# Patient Record
Sex: FEMALE | Race: WHITE | NOT HISPANIC OR LATINO | Employment: FULL TIME | URBAN - METROPOLITAN AREA
[De-identification: names, ages, dates, MRNs, and addresses within clinical notes are randomized per-mention and may not be internally consistent; named-entity substitution may affect disease eponyms.]

---

## 2018-04-04 ENCOUNTER — OFFICE VISIT (OUTPATIENT)
Dept: LAB | Facility: SCHOOL | Age: 38
End: 2018-04-04
Payer: COMMERCIAL

## 2018-04-04 VITALS
DIASTOLIC BLOOD PRESSURE: 78 MMHG | WEIGHT: 162 LBS | HEART RATE: 86 BPM | TEMPERATURE: 98.7 F | HEIGHT: 65 IN | OXYGEN SATURATION: 98 % | BODY MASS INDEX: 26.99 KG/M2 | SYSTOLIC BLOOD PRESSURE: 136 MMHG

## 2018-04-04 DIAGNOSIS — Z00.00 ENCOUNTER FOR WELLNESS EXAMINATION: Primary | ICD-10-CM

## 2018-04-04 PROCEDURE — 99203 OFFICE O/P NEW LOW 30 MIN: CPT | Performed by: NURSE PRACTITIONER

## 2018-04-04 RX ORDER — PRENATAL VIT/IRON FUM/FOLIC AC 27MG-0.8MG
1 TABLET ORAL
COMMUNITY
Start: 2013-07-02 | End: 2018-10-17

## 2018-04-04 RX ORDER — METRONIDAZOLE 500 MG/1
500 TABLET ORAL
COMMUNITY
Start: 2018-03-30 | End: 2018-04-06

## 2018-04-04 NOTE — PROGRESS NOTES
"  SUBJECTIVE:  CC: Josie Gamez is an 37 year old woman who presents for Wellness Check at [facility] [id number].     Review of Healthy Lifestyle:    Do you get at least three servings of calcium containing foods daily (dairy, green leafy vegetables, etc.)? yes     Do you have a high-fiber diet? no     Amount of exercise or daily activities, outside of work: 2 day(s) per week    Do you wear sunscreen on a regular basis? Yes      Are you taking your medications regularly Yes    Have you had an eye exam in the past two years? yes    Do you see a dentist twice per year? yes    Do you have sleep apnea, excessive snoring or excessive daytime drowsiness? no    Do you use tobacco in any form? no       OBJECTIVE:    Vitals: /78  Pulse 86  Temp 98.7  F (37.1  C) (Tympanic)  Ht 5' 5\" (1.651 m)  Wt 162 lb (73.5 kg)  SpO2 98%  BMI 26.96 kg/m2  BMI= Body mass index is 26.96 kg/(m^2).    HEARING: Right Ear:        500Hz:  RESPONSE- on Level: 25 db   1000 Hz: RESPONSE- on Level: 25 db   2000 Hz: RESPONSE- on Level:   20 db    4000 Hz: RESPONSE- on Level:   20 db     Left Ear:       500Hz:  RESPONSE- on Level: 25 db   1000 Hz: RESPONSE- on Level: 25 db   2000 Hz: RESPONSE- on Level:   20 db    4000 Hz: RESPONSE- on Level:   20 db     VISION:  Right eye:  20/20  Left eye:     20/20  Both eyes: 20/20  Corrective lenses?  Yes     Medication Reconciliation: complete    ASSESSMENT/PLAN:  (Z00.00) Encounter for wellness examination  (primary encounter diagnosis)  Comment:    Plan:  Not fasting today - declined blood work.  Is 35 weeks pregnant.      COUNSELING:      reports that she has never smoked. She has never used smokeless tobacco.    Estimated body mass index is 26.96 kg/(m^2) as calculated from the following:    Height as of this encounter: 5' 5\" (1.651 m).    Weight as of this encounter: 162 lb (73.5 kg).       Counseling Resources  [employer] MyPlate  [email or web address]    [place] [employer] " PROVIDER  [facility]

## 2018-04-04 NOTE — MR AVS SNAPSHOT
"              After Visit Summary   4/4/2018    Josie Gamez    MRN: [id number]           Patient Information     Date Of Birth          1980        Visit Information        Provider Department      4/4/2018 3:00 PM Melvi Potter NP [facility]         Today's Diagnoses     Encounter for wellness examination    -  1      Care Instructions    Wellness Visit Recommendations:      See your regular primary care health provider every year in order to help stay healthy.    Review health changes.     Review your medicines if your doctor has prescribed any.    Talk to your provider about how often to have your cholesterol checked.    If you are at risk for diabetes, you should have a diabetes test (fasting glucose).    Shots: Get a flu shot each year. Get a tetanus shot every 10 years.     Review with your primary care provider other immunizations that you may need based on your age and/or medical/surgical history    Nutrition:     Eat at least 5 servings of fruits and vegetables each day.    Eat whole-grain bread, whole-wheat pasta and brown rice instead of white grains and rice.    Preventive Care to be reviewed by your primary care provider:    Females:        Cervical Cancer Screening                          Breast Cancer Screening                          Colon Cancer Screening  Males:             Prostrate Cancer Screening                          Colon Cancer Screening      Lifestyle:    Exercise at least 150 minutes a week (30 minutes a day, 5 days of the week). This will help you control your weight and help prevent disease or manage disease.    Limit alcohol to one drink per day or less depending on your past medical history.    No smoking.     Wear sunscreen to prevent skin cancer.    See your dentist every six months for an exam and cleaning.    Today's Vital Signs:  /78  Pulse 86  Temp 98.7  F (37.1  C) (Tympanic)  Ht 5' 5\" (1.651 m)  Wt 162 lb (73.5 kg)  " "SpO2 98%  BMI 26.96 kg/m2                                                                                 Josie Gamez has completed a Wellness Check at the [facility] on 2018.      ____________________________________________  [employer] PROVIDER                         Follow-ups after your visit        Who to contact     If you have questions or need follow up information about today's clinic visit or your schedule please contact [facility] directly at [phone].  Normal or non-critical lab and imaging results will be communicated to you by MyChart, letter or phone within 4 business days after the clinic has received the results. If you do not hear from us within 7 days, please contact the clinic through [employer]hart or phone. If you have a critical or abnormal lab result, we will notify you by phone as soon as possible.  Submit refill requests through [employer] or call your pharmacy and they will forward the refill request to us. Please allow 3 business days for your refill to be completed.          Additional Information About Your Visit        [employer] Information     [employer] lets you send messages to your doctor, view your test results, renew your prescriptions, schedule appointments and more. To sign up, go to [email or web address] . Click on \"Log in\" on the left side of the screen, which will take you to the Welcome page. Then click on \"Sign up Now\" on the right side of the page.     You will be asked to enter the access code listed below, as well as some personal information. Please follow the directions to create your username and password.     Your access code is: [email or web address]  Expires: 7/3/2018  3:20 PM     Your access code will  in 90 days. If you need help or a new code, please call your [facility] or [phone].        Care EveryWhere ID     This is your Care EveryWhere ID. This could be used by other organizations to access your " "[place] medical records  [id number]        Your Vitals Were     Pulse Temperature Height Pulse Oximetry BMI (Body Mass Index)       86 98.7  F (37.1  C) (Tympanic) 5' 5\" (1.651 m) 98% 26.96 kg/m2        Blood Pressure from Last 3 Encounters:   04/04/18 136/78    Weight from Last 3 Encounters:   04/04/18 162 lb (73.5 kg)              Today, you had the following     No orders found for display       Primary Care Provider Fax #    Physician No Ref-Primary [phone]       No address on file        Equal Access to Services     [facility] : Hadii joe calvin hadasho Soomaali, waaxda luqadaha, qaybta kaalmada aderon, olman perez . So [facility] [phone].    ATENCIÓN: Si habla español, tiene a harp disposición servicios gratuitos de asistencia lingüística. Llame al [phone].    We comply with applicable federal civil rights laws and [place] laws. We do not discriminate on the basis of race, color, national origin, age, disability, sex, sexual orientation, or gender identity.            Thank you!     Thank you for choosing [place] [address]  for your care. Our goal is always to provide you with excellent care. Hearing back from our patients is one way we can continue to improve our services. Please take a few minutes to complete the written survey that you may receive in the mail after your visit with us. Thank you!             Your Updated Medication List - Protect others around you: Learn how to safely use, store and throw away your medicines at www.disposemymeds.org.          This list is accurate as of 4/4/18  3:20 PM.  Always use your most recent med list.                   Brand Name Dispense Instructions for use Diagnosis    metroNIDAZOLE 500 MG tablet    FLAGYL     Take 500 mg by mouth        prenatal multivitamin plus iron 27-0.8 MG Tabs per tablet      Take 1 tablet by mouth        sertraline 50 MG tablet    ZOLOFT     Take 100 mg by mouth          "

## 2018-04-04 NOTE — NURSING NOTE
Initial /78  Pulse 86  Temp 98.7  F (37.1  C) (Tympanic)  Wt 162 lb (73.5 kg)  SpO2 98% There is no height or weight on file to calculate BMI. .    Lou Man CMA ([facility])

## 2018-04-04 NOTE — PATIENT INSTRUCTIONS
"Wellness Visit Recommendations:      See your regular primary care health provider every year in order to help stay healthy.    Review health changes.     Review your medicines if your doctor has prescribed any.    Talk to your provider about how often to have your cholesterol checked.    If you are at risk for diabetes, you should have a diabetes test (fasting glucose).    Shots: Get a flu shot each year. Get a tetanus shot every 10 years.     Review with your primary care provider other immunizations that you may need based on your age and/or medical/surgical history    Nutrition:     Eat at least 5 servings of fruits and vegetables each day.    Eat whole-grain bread, whole-wheat pasta and brown rice instead of white grains and rice.    Preventive Care to be reviewed by your primary care provider:    Females:        Cervical Cancer Screening                          Breast Cancer Screening                          Colon Cancer Screening  Males:             Prostrate Cancer Screening                          Colon Cancer Screening      Lifestyle:    Exercise at least 150 minutes a week (30 minutes a day, 5 days of the week). This will help you control your weight and help prevent disease or manage disease.    Limit alcohol to one drink per day or less depending on your past medical history.    No smoking.     Wear sunscreen to prevent skin cancer.    See your dentist every six months for an exam and cleaning.    Today's Vital Signs:  /78  Pulse 86  Temp 98.7  F (37.1  C) (Tympanic)  Ht 5' 5\" (1.651 m)  Wt 162 lb (73.5 kg)  SpO2 98%  BMI 26.96 kg/m2                                                                                 Josie Gamez has completed a Wellness Check at the [place] [address] [facility] on 4/4/2018.      ____________________________________________  [employer] PROVIDER                 "

## 2018-10-17 ENCOUNTER — OFFICE VISIT (OUTPATIENT)
Dept: LAB | Facility: SCHOOL | Age: 38
End: 2018-10-17
Payer: COMMERCIAL

## 2018-10-17 VITALS
BODY MASS INDEX: 24.32 KG/M2 | TEMPERATURE: 97.8 F | WEIGHT: 146 LBS | HEIGHT: 65 IN | OXYGEN SATURATION: 99 % | SYSTOLIC BLOOD PRESSURE: 110 MMHG | DIASTOLIC BLOOD PRESSURE: 74 MMHG | HEART RATE: 67 BPM

## 2018-10-17 DIAGNOSIS — Z00.00 ENCOUNTER FOR WELLNESS EXAMINATION IN ADULT: Primary | ICD-10-CM

## 2018-10-17 PROCEDURE — 99213 OFFICE O/P EST LOW 20 MIN: CPT

## 2018-10-17 RX ORDER — ALPRAZOLAM 0.25 MG
0.25 TABLET ORAL
COMMUNITY
Start: 2018-08-22

## 2018-10-17 NOTE — MR AVS SNAPSHOT
After Visit Summary   10/17/2018    Josie Gamez    MRN: [id number]           Patient Information     Date Of Birth          1980        Visit Information        Provider Department      10/17/2018 11:30 AM Provider, [facility]        Today's Diagnoses     Encounter for wellness examination in adult    -  1      Care Instructions                    Josie Gamez has completed a Wellness Check at the [facility] on 10/17/2018.      ____________________________________________  [facility] PROVIDER                                                                               Wellness Visit Recommendations:      See your regular primary care health provider every year in order to help stay healthy.    Review health changes.     Review your medicines if your doctor has prescribed any.    Talk to your provider about how often to have your cholesterol checked.    If you are at risk for diabetes, you should have a diabetes test (fasting glucose).    Shots: Get a flu shot each year. Get a tetanus shot every 10 years.     Review with your primary care provider other immunizations that you may need based on your age and/or medical/surgical history    Nutrition:     Eat at least 5 servings of fruits and vegetables each day.    Eat whole-grain bread, whole-wheat pasta and brown rice instead of white grains and rice.    Preventive Care to be reviewed by your primary care provider:    Females:        Cervical Cancer Screening                          Breast Cancer Screening                          Colon Cancer Screening  Males:             Prostrate Cancer Screening                          Colon Cancer Screening      Lifestyle:    Exercise at least 150 minutes a week (30 minutes a day, 5 days of the week). This will help you control your weight and help prevent disease or manage disease.    Limit alcohol to one drink per day or less  "depending on your past medical history.    No smoking.     Wear sunscreen to prevent skin cancer.    See your dentist every six months for an exam and cleaning.    Today's Vital Signs:  /74 (BP Location: Right arm, Patient Position: Chair, Cuff Size: Adult Regular)  Pulse 67  Temp 97.8  F (36.6  C) (Tympanic)  Ht 5' 5\" (1.651 m)  Wt 146 lb (66.2 kg)  SpO2 99%  BMI 24.3 kg/m2          Follow-ups after your visit        Who to contact     If you have questions or need follow up information about today's clinic visit or your schedule please contact [place] [address] directly at [phone].  Normal or non-critical lab and imaging results will be communicated to you by CloudBytehart, letter or phone within 4 business days after the clinic has received the results. If you do not hear from us within 7 days, please contact the clinic through [employer] or phone. If you have a critical or abnormal lab result, we will notify you by phone as soon as possible.  Submit refill requests through [employer] or call your pharmacy and they will forward the refill request to us. Please allow 3 business days for your refill to be completed.          Additional Information About Your Visit        [employer] Information     [employer] lets you send messages to your doctor, view your test results, renew your prescriptions, schedule appointments and more. To sign up, go to [email or web address] . Click on \"Log in\" on the left side of the screen, which will take you to the Welcome page. Then click on \"Sign up Now\" on the right side of the page.     You will be asked to enter the access code listed below, as well as some personal information. Please follow the directions to create your username and password.     Your access code is: [email or web address]  Expires: 1/15/2019 11:49 AM     Your access code will  in 90 days. If you need help or a new code, please call your [facility] or [phone].        Care EveryWhere ID  " "   This is your Care EveryWhere ID. This could be used by other organizations to access your [place] medical records  [id number]        Your Vitals Were     Pulse Temperature Height Pulse Oximetry BMI (Body Mass Index)       67 97.8  F (36.6  C) (Tympanic) 5' 5\" (1.651 m) 99% 24.3 kg/m2        Blood Pressure from Last 3 Encounters:   10/17/18 110/74   04/04/18 136/78    Weight from Last 3 Encounters:   10/17/18 146 lb (66.2 kg)   04/04/18 162 lb (73.5 kg)              Today, you had the following     No orders found for display       Primary Care Provider Fax #    Physician No Ref-Primary [phone]       No address on file        Equal Access to Services     FITO RAO : Juma torreo Soalejandra, waaxda luqadaha, qaybta kaalmada adeegyada, olman perez . So [facility] [phone].    ATENCIÓN: Si habla español, tiene a harp disposición servicios gratuitos de asistencia lingüística. Llame al [phone].    We comply with applicable federal civil rights laws and [place] laws. We do not discriminate on the basis of race, color, national origin, age, disability, sex, sexual orientation, or gender identity.            Thank you!     Thank you for choosing [place] [address]  for your care. Our goal is always to provide you with excellent care. Hearing back from our patients is one way we can continue to improve our services. Please take a few minutes to complete the written survey that you may receive in the mail after your visit with us. Thank you!             Your Updated Medication List - Protect others around you: Learn how to safely use, store and throw away your medicines at www.disposemymeds.org.          This list is accurate as of 10/17/18 11:49 AM.  Always use your most recent med list.                   Brand Name Dispense Instructions for use Diagnosis    ALPRAZolam 0.25 MG tablet    XANAX     Take 0.25 mg by mouth        Ascorbic Acid 125 MG Chew      " Take 2 tablets by mouth        sertraline 50 MG tablet    ZOLOFT     Take 100 mg by mouth

## 2018-10-17 NOTE — PROGRESS NOTES
"  SUBJECTIVE:  CC: Josie Gamez is an 38 year old woman who presents for Wellness Check at [facility] [id number].     Review of Healthy Lifestyle:    Do you get at least three servings of calcium containing foods daily (dairy, green leafy vegetables, etc.)? yes     Do you have a high-fiber diet? no     Amount of exercise or daily activities, outside of work: 2 day(s) per week    Do you wear sunscreen on a regular basis? Yes      Are you taking your medications regularly Yes     Have you had an eye exam in the past two years? yes    Do you see a dentist twice per year? yes    Do you have sleep apnea, excessive snoring or excessive daytime drowsiness? no    Do you use tobacco in any form? no       OBJECTIVE:    Vitals: /74 (BP Location: Right arm, Patient Position: Chair, Cuff Size: Adult Regular)  Pulse 67  Temp 97.8  F (36.6  C) (Tympanic)  Ht 5' 5\" (1.651 m)  Wt 146 lb (66.2 kg)  SpO2 99%  BMI 24.3 kg/m2  BMI= Body mass index is 24.3 kg/(m^2).    HEARING: Right Ear:        500Hz:  RESPONSE- on Level:   20 db    1000 Hz: RESPONSE- on Level:   20 db    2000 Hz: RESPONSE- on Level:   20 db    4000 Hz: RESPONSE- on Level:   20 db     Left Ear:       500Hz:  RESPONSE- on Level:   20 db    1000 Hz: RESPONSE- on Level:   20 db    2000 Hz: RESPONSE- on Level:   20 db    4000 Hz: RESPONSE- on Level:   20 db     VISION:  Right eye:  20/20  Left eye:     20/20  Both eyes: 20/20  Corrective lenses?  Yes    Medication Reconciliation: complete    ASSESSMENT/PLAN:  (Z00.00) Encounter for wellness examination in adult  (primary encounter diagnosis)  Comment:    Plan:  No health concerns or changes.    Declines Lipids or glucose.        COUNSELING:      reports that she has never smoked. She has never used smokeless tobacco.    Estimated body mass index is 24.3 kg/(m^2) as calculated from the following:    Height as of this encounter: 5' 5\" (1.651 m).    Weight as of this encounter: 146 lb " (66.2 kg).       Counseling Resources  [employer]'s MyPlate  [email or web address]/    [employer] S[employer] PROVIDER  [facility]

## 2018-10-17 NOTE — PATIENT INSTRUCTIONS
"                Josie Gamez has completed a Wellness Check at the [facility] Clinic on 10/17/2018.      ____________________________________________  [employer] PROVIDER                                                                               Wellness Visit Recommendations:      See your regular primary care health provider every year in order to help stay healthy.    Review health changes.     Review your medicines if your doctor has prescribed any.    Talk to your provider about how often to have your cholesterol checked.    If you are at risk for diabetes, you should have a diabetes test (fasting glucose).    Shots: Get a flu shot each year. Get a tetanus shot every 10 years.     Review with your primary care provider other immunizations that you may need based on your age and/or medical/surgical history    Nutrition:     Eat at least 5 servings of fruits and vegetables each day.    Eat whole-grain bread, whole-wheat pasta and brown rice instead of white grains and rice.    Preventive Care to be reviewed by your primary care provider:    Females:        Cervical Cancer Screening                          Breast Cancer Screening                          Colon Cancer Screening  Males:             Prostrate Cancer Screening                          Colon Cancer Screening      Lifestyle:    Exercise at least 150 minutes a week (30 minutes a day, 5 days of the week). This will help you control your weight and help prevent disease or manage disease.    Limit alcohol to one drink per day or less depending on your past medical history.    No smoking.     Wear sunscreen to prevent skin cancer.    See your dentist every six months for an exam and cleaning.    Today's Vital Signs:  /74 (BP Location: Right arm, Patient Position: Chair, Cuff Size: Adult Regular)  Pulse 67  Temp 97.8  F (36.6  C) (Tympanic)  Ht 5' 5\" (1.651 m)  Wt 146 lb (66.2 kg)  SpO2 99%  BMI 24.3 kg/m2  "

## 2018-10-17 NOTE — NURSING NOTE
"Initial /74 (BP Location: Right arm, Patient Position: Chair, Cuff Size: Adult Regular)  Pulse 67  Temp 97.8  F (36.6  C) (Tympanic)  Ht 5' 5\" (1.651 m)  Wt 146 lb (66.2 kg)  SpO2 99%  BMI 24.3 kg/m2 Estimated body mass index is 24.3 kg/(m^2) as calculated from the following:    Height as of this encounter: 5' 5\" (1.651 m).    Weight as of this encounter: 146 lb (66.2 kg). .    Lou Man CMA ([facility])  "

## 2019-11-16 ENCOUNTER — OFFICE VISIT (OUTPATIENT)
Dept: URGENT CARE | Facility: URGENT CARE | Age: 39
End: 2019-11-16
Payer: COMMERCIAL

## 2019-11-16 VITALS
BODY MASS INDEX: 24.83 KG/M2 | HEART RATE: 75 BPM | OXYGEN SATURATION: 98 % | WEIGHT: 149 LBS | SYSTOLIC BLOOD PRESSURE: 116 MMHG | DIASTOLIC BLOOD PRESSURE: 70 MMHG | HEIGHT: 65 IN | TEMPERATURE: 98.4 F

## 2019-11-16 DIAGNOSIS — H21.89 CILIARY FLUSH: ICD-10-CM

## 2019-11-16 DIAGNOSIS — J01.90 ACUTE SINUSITIS WITH SYMPTOMS > 10 DAYS: Primary | ICD-10-CM

## 2019-11-16 PROCEDURE — 99214 OFFICE O/P EST MOD 30 MIN: CPT | Performed by: FAMILY MEDICINE

## 2019-11-16 RX ORDER — DOXYCYCLINE 100 MG/1
100 CAPSULE ORAL 2 TIMES DAILY WITH MEALS
Qty: 20 CAPSULE | Refills: 0 | Status: SHIPPED | OUTPATIENT
Start: 2019-11-16 | End: 2019-11-26

## 2019-11-16 ASSESSMENT — MIFFLIN-ST. JEOR: SCORE: 1361.74

## 2019-11-16 NOTE — PROGRESS NOTES
"Chief complaint: sinus congestion    Denies any possibility of pregnancy declined a pregnancy test    3 weeks ago son had croup and ear infection   Then patient had hives for 2 days then went away but since then had cough and congestion  Patient taking over the counter meds and was able to control symptoms   However now has a sore throat and a cough  Sinuses still running    The night before this happened she did sleep with her contacts on   2 days ago woke up left eye was red  Patient took allergy meds not much relief  Burning  Light sensitive since yesterday    Colds, sinus congestion, facial pain  Cough Yes  Greenish discharge  Fever No  Progressively getting worse: YES  Thought was getting better then started getting worse: YES  Getting better:  No  Exposure to pertussis or pertussis like symptoms: No    Problem list and histories reviewed & adjusted, as indicated.  Additional history: as documented    Problem list, Medication list, Allergies, and Medical/Social/Surgical histories reviewed in EPIC and updated as appropriate.    ROS:  Constitutional, HEENT, cardiovascular, pulmonary, gi and gu systems are negative, except as otherwise noted.    OBJECTIVE:                                                    /70   Pulse 75   Temp 98.4  F (36.9  C) (Oral)   Ht 1.651 m (5' 5\")   Wt 67.6 kg (149 lb)   SpO2 98%   BMI 24.79 kg/m    Body mass index is 24.79 kg/m .  GENERAL: healthy, alert and no distress  EYES:   No grossly visible conjunctival or corneal foreign body No hyphema, no hypopyon,, no periorbital swelling or cellulitis or pain with extraocular muscle movement.   Right eye normal  Left eye positive for ciliary flush and photophobia   HENT: ear canals and TM's normal, nose and mouth without ulcers or lesions  Sinuses: turbinates erythematous   NECK: no adenopathy, no asymmetry, masses, or scars and thyroid normal to palpation  RESP: lungs clear to auscultation - no rales, rhonchi or wheezes   CV: " regular rate and rhythm, normal S1 S2, no S3 or S4, no murmur, click or rub, no peripheral edema and peripheral pulses strong  ABDOMEN: soft, nontender, no hepatosplenomegaly, no masses and bowel sounds normal  MS: no gross musculoskeletal defects noted, no edema  SKIN: no suspicious lesions or rashes  NEURO: Normal strength and tone, mentation intact and speech normal  PSYCH: mentation appears normal, affect normal/bright    Diagnostic Test Results:  No results found for this or any previous visit (from the past 24 hour(s)).     ASSESSMENT/PLAN:                                                        ICD-10-CM    1. Acute sinusitis with symptoms > 10 days J01.90 doxycycline monohydrate (MONODOX) 100 MG capsule   2. Ciliary flush H21.89      For sinusitis: Prescribed with doxycycline  Aware of the risks of GI intolerance, GERD, GI complications and photosensitivity with doxycycline.  Warned contraindicated in pregnancy   Recommend follow up with primary care provider if no relief  sooner if worse  Adverse reactions of medications discussed.  Over the counter medications discussed.   Aware to come back in if with worsening symptoms or if no relief despite treatment plan  Patient voiced understanding and had no further questions.     For left eye irritation- risk factors include contact lens wear  Positive for ciliary flush - concern for keratitis - recommend eye doctor evaluation today  [facility] - she plans to go there ASAP  Risks discussed  She is driving and has photophobia - eye was anesthetized with proparacaine eye drop just for comfort to help her with driving although discussed it's best not to drive if she has eye discomfort.     MD Serenity Costello MD  [facility]

## 2020-03-01 ENCOUNTER — OFFICE VISIT (OUTPATIENT)
Dept: URGENT CARE | Facility: URGENT CARE | Age: 40
End: 2020-03-01
Payer: COMMERCIAL

## 2020-03-01 VITALS
HEART RATE: 96 BPM | OXYGEN SATURATION: 96 % | SYSTOLIC BLOOD PRESSURE: 116 MMHG | WEIGHT: 150 LBS | BODY MASS INDEX: 24.96 KG/M2 | TEMPERATURE: 101.1 F | DIASTOLIC BLOOD PRESSURE: 74 MMHG

## 2020-03-01 DIAGNOSIS — J10.1 INFLUENZA A: Primary | ICD-10-CM

## 2020-03-01 DIAGNOSIS — R68.89 FLU-LIKE SYMPTOMS: ICD-10-CM

## 2020-03-01 LAB
FLUAV+FLUBV AG SPEC QL: NEGATIVE
FLUAV+FLUBV AG SPEC QL: POSITIVE
SPECIMEN SOURCE: ABNORMAL

## 2020-03-01 PROCEDURE — 87804 INFLUENZA ASSAY W/OPTIC: CPT | Performed by: FAMILY MEDICINE

## 2020-03-01 PROCEDURE — 99213 OFFICE O/P EST LOW 20 MIN: CPT | Performed by: FAMILY MEDICINE

## 2020-03-01 RX ORDER — DESVENLAFAXINE 50 MG/1
100 TABLET, EXTENDED RELEASE ORAL
COMMUNITY
Start: 2020-01-02 | End: 2022-12-15

## 2020-03-01 RX ORDER — PROPRANOLOL HCL 60 MG
120 CAPSULE, EXTENDED RELEASE 24HR ORAL
COMMUNITY
Start: 2019-10-17 | End: 2022-12-15

## 2020-03-01 RX ORDER — DICYCLOMINE HCL 20 MG
TABLET ORAL
COMMUNITY
Start: 2019-12-24 | End: 2022-12-15

## 2020-03-01 RX ORDER — OSELTAMIVIR PHOSPHATE 75 MG/1
75 CAPSULE ORAL 2 TIMES DAILY
Qty: 10 CAPSULE | Refills: 0 | Status: SHIPPED | OUTPATIENT
Start: 2020-03-01 | End: 2020-03-06

## 2020-03-01 NOTE — PROGRESS NOTES
Subjective     Josie Gamez is a 39 year old female who presents to clinic today for the following health issues:    HPI   Chief Complaint   Patient presents with     Flu Symptoms     FEVER AND BODY ACHES STARTED YESTERDAY        Duration: yesterday     Description (location/character/radiation): fever, chills, runny nose, cough, body aches    Intensity:  moderate    Accompanying signs and symptoms: none     History (similar episodes/previous evaluation): was in cruise, one kid diagnosed with influenza A    Precipitating or alleviating factors: None    Therapies tried and outcome: OTC analgesia        There is no problem list on file for this patient.    History reviewed. No pertinent surgical history.    Social History     Tobacco Use     Smoking status: Never Smoker     Smokeless tobacco: Never Used   Substance Use Topics     Alcohol use: Not on file     History reviewed. No pertinent family history.      Current Outpatient Medications   Medication Sig Dispense Refill     ALPRAZolam (XANAX) 0.25 MG tablet Take 0.25 mg by mouth       Ascorbic Acid 125 MG CHEW Take 2 tablets by mouth       desvenlafaxine (KHEDEZLA) 50 MG 24 hr tablet        dicyclomine (BENTYL) 20 MG tablet        propranolol ER (INDERAL LA) 60 MG 24 hr capsule Take 60 mg by mouth       sertraline (ZOLOFT) 50 MG tablet Take 100 mg by mouth       Allergies   Allergen Reactions     Amoxicillin-Pot Clavulanate Hives     Azithromycin Hives     Cefdinir Hives     Clindamycin Hives     Doxycycline      Cephalexin Hives     No lab results found.   BP Readings from Last 3 Encounters:   03/01/20 116/74   11/16/19 116/70   10/17/18 110/74    Wt Readings from Last 3 Encounters:   03/01/20 68 kg (150 lb)   11/16/19 67.6 kg (149 lb)   10/17/18 66.2 kg (146 lb)                 Reviewed and updated as needed this visit by Provider         Review of Systems   ROS COMP: Constitutional, HEENT, cardiovascular, pulmonary, gi and gu systems are negative, except as  otherwise noted.      Objective    /74   Pulse 96   Temp 101.1  F (38.4  C) (Oral)   Wt 68 kg (150 lb)   SpO2 96%   Breastfeeding No   BMI 24.96 kg/m    Body mass index is 24.96 kg/m .  Physical Exam   GENERAL: alert and no distress  EYES: Eyes grossly normal to inspection, PERRL and conjunctivae and sclerae normal  HENT: normal cephalic/atraumatic, ear canals and TM's normal, rhinorrhea clear, oral mucous membranes moist and oropharxnx crowded  NECK: no adenopathy, no asymmetry, masses, or scars and thyroid normal to palpation  RESP: lungs clear to auscultation - no rales, rhonchi or wheezes  CV: regular rate and rhythm, normal S1 S2, no S3 or S4, no murmur, click or rub, no peripheral edema and peripheral pulses strong  ABDOMEN: soft, nontender, no hepatosplenomegaly, no masses and bowel sounds normal  MS: no gross musculoskeletal defects noted, no edema        Assessment & Plan     (J10.1) Influenza A  (primary encounter diagnosis)  Comment: Symptoms secondary to influenza A.  Treatment options discussed.  Tamiflu prescribed considering severity of symptoms, common side effects discussed.  Suggested to continue well hydration, warm fluids, over-the-counter analgesia.  Return criteria discussed in detail.  Patient understood and in agreement with above plan.  All questions answered.      (R60.83) Flu-like symptoms  Comment:   Plan: Influenza A/B antigen, oseltamivir (TAMIFLU) 75        MG capsule                 Patient Instructions     Patient Education     Influenza (Adult)    Influenza is also called the flu. It is a viral illness that affects the air passages of your lungs. It is different from the common cold. The flu can easily be passed from one to person to another. It may be spread through the air by coughing and sneezing. Or it can be spread by touching the sick person and then touching your own eyes, nose, or mouth.  The flu starts 1 to 3 days after you are exposed to the flu virus. It may  last for 1 to 2 weeks but many people feel tired or fatigued for many weeks afterward. You usually don t need to take antibiotics unless you have a complication. This might be an ear or sinus infection or pneumonia.  Symptoms of the flu may be mild or severe. They can include extreme tiredness (wanting to stay in bed all day), chills, fevers, muscle aches, soreness with eye movement, headache, and a dry, hacking cough.  Home care  Follow these guidelines when caring for yourself at home:    Avoid being around cigarette smoke, whether yours or other people s.    Acetaminophen or ibuprofen will help ease your fever, muscle aches, and headache. Don t give aspirin to anyone younger than 18 who has the flu. Aspirin can harm the liver.    Nausea and loss of appetite are common with the flu. Eat light meals. Drink 6 to 8 glasses of liquids every day. Good choices are water, sport drinks, soft drinks without caffeine, juices, tea, and soup. Extra fluids will also help loosen secretions in your nose and lungs.    Over-the-counter cold medicines will not make the flu go away faster. But the medicines may help with coughing, sore throat, and congestion in your nose and sinuses. Don t use a decongestant if you have high blood pressure.    Stay home until your fever has been gone for at least 24 hours without using medicine to reduce fever.  Follow-up care  Follow up with your healthcare provider, or as advised, if you are not getting better over the next week.  If you are age 65 or older, talk with your provider about getting a pneumococcal vaccine every 5 years. You should also get this vaccine if you have chronic asthma or COPD. All adults should get a flu vaccine every fall. Ask your provider about this.  When to seek medical advice  Call your healthcare provider right away if any of these occur:    Cough with lots of colored mucus (sputum) or blood in your mucus    Chest pain, shortness of breath, wheezing, or trouble  breathing    Severe headache, or face, neck, or ear pain    New rash with fever    Fever of 100.4 F (38 C) or higher, or as directed by your healthcare provider    Confusion, behavior change, or seizure    Severe weakness or dizziness    You get a new fever or cough after getting better for a few days  Date Last Reviewed: 1/1/2017 2000-2019 [employer]. [address] [place] [address]. All rights reserved. This information is not intended as a substitute for professional medical care. Always follow your healthcare professional's instructions.               No follow-ups on file.    Edilberto Lujan MD  [facility]

## 2020-03-01 NOTE — PATIENT INSTRUCTIONS
Patient Education     Influenza (Adult)    Influenza is also called the flu. It is a viral illness that affects the air passages of your lungs. It is different from the common cold. The flu can easily be passed from one to person to another. It may be spread through the air by coughing and sneezing. Or it can be spread by touching the sick person and then touching your own eyes, nose, or mouth.  The flu starts 1 to 3 days after you are exposed to the flu virus. It may last for 1 to 2 weeks but many people feel tired or fatigued for many weeks afterward. You usually don t need to take antibiotics unless you have a complication. This might be an ear or sinus infection or pneumonia.  Symptoms of the flu may be mild or severe. They can include extreme tiredness (wanting to stay in bed all day), chills, fevers, muscle aches, soreness with eye movement, headache, and a dry, hacking cough.  Home care  Follow these guidelines when caring for yourself at home:    Avoid being around cigarette smoke, whether yours or other people s.    Acetaminophen or ibuprofen will help ease your fever, muscle aches, and headache. Don t give aspirin to anyone younger than 18 who has the flu. Aspirin can harm the liver.    Nausea and loss of appetite are common with the flu. Eat light meals. Drink 6 to 8 glasses of liquids every day. Good choices are water, sport drinks, soft drinks without caffeine, juices, tea, and soup. Extra fluids will also help loosen secretions in your nose and lungs.    Over-the-counter cold medicines will not make the flu go away faster. But the medicines may help with coughing, sore throat, and congestion in your nose and sinuses. Don t use a decongestant if you have high blood pressure.    Stay home until your fever has been gone for at least 24 hours without using medicine to reduce fever.  Follow-up care  Follow up with your healthcare provider, or as advised, if you are not getting better over the next  week.  If you are age 65 or older, talk with your provider about getting a pneumococcal vaccine every 5 years. You should also get this vaccine if you have chronic asthma or COPD. All adults should get a flu vaccine every fall. Ask your provider about this.  When to seek medical advice  Call your healthcare provider right away if any of these occur:    Cough with lots of colored mucus (sputum) or blood in your mucus    Chest pain, shortness of breath, wheezing, or trouble breathing    Severe headache, or face, neck, or ear pain    New rash with fever    Fever of 100.4 F (38 C) or higher, or as directed by your healthcare provider    Confusion, behavior change, or seizure    Severe weakness or dizziness    You get a new fever or cough after getting better for a few days  Date Last Reviewed: 1/1/2017 2000-2019 [employer]. [address] [place] [address], [place] [address]. All rights reserved. This information is not intended as a substitute for professional medical care. Always follow your healthcare professional's instructions.

## 2020-03-10 ENCOUNTER — HEALTH MAINTENANCE LETTER (OUTPATIENT)
Age: 40
End: 2020-03-10

## 2020-10-26 ENCOUNTER — OFFICE VISIT (OUTPATIENT)
Dept: LAB | Facility: SCHOOL | Age: 40
End: 2020-10-26
Payer: COMMERCIAL

## 2020-10-26 VITALS
RESPIRATION RATE: 16 BRPM | TEMPERATURE: 98.2 F | WEIGHT: 143 LBS | HEIGHT: 65 IN | DIASTOLIC BLOOD PRESSURE: 60 MMHG | BODY MASS INDEX: 23.82 KG/M2 | SYSTOLIC BLOOD PRESSURE: 100 MMHG | HEART RATE: 60 BPM | OXYGEN SATURATION: 99 %

## 2020-10-26 DIAGNOSIS — Z00.00 WELLNESS EXAMINATION: Primary | ICD-10-CM

## 2020-10-26 PROCEDURE — 99213 OFFICE O/P EST LOW 20 MIN: CPT

## 2020-10-26 RX ORDER — SUMATRIPTAN 50 MG/1
50 TABLET, FILM COATED ORAL
COMMUNITY
End: 2022-12-15

## 2020-10-26 RX ORDER — BUPROPION HYDROCHLORIDE 150 MG/1
150 TABLET ORAL EVERY MORNING
COMMUNITY
End: 2022-12-15

## 2020-10-26 ASSESSMENT — MIFFLIN-ST. JEOR: SCORE: 1329.52

## 2020-10-26 NOTE — PROGRESS NOTES
"  SUBJECTIVE:  CC: Josie Gamez is an 40 year old woman who presents for Wellness Check at [facility] [id number].     Review of Healthy Lifestyle:    Do you get at least three servings of calcium containing foods daily (dairy, green leafy vegetables, etc.)? yes     Do you have a high-fiber diet? yes     Amount of exercise or daily activities, outside of work: 3-4 day(s) per week    Do you wear sunscreen on a regular basis? Yes 50     Are you taking your medications regularly No    Have you had an eye exam in the past two years? yes    Do you see a dentist twice per year? yes    Do you have sleep apnea, excessive snoring or excessive daytime drowsiness? no    Do you use tobacco in any form? no       OBJECTIVE:    Vitals: /60 (BP Location: Right arm, Patient Position: Sitting, Cuff Size: Adult Regular)   Pulse 60   Temp 98.2  F (36.8  C) (Tympanic)   Resp 16   Ht 1.651 m (5' 5\")   Wt 64.9 kg (143 lb)   LMP 10/14/2020 (Exact Date)   SpO2 99%   Breastfeeding No   BMI 23.80 kg/m    BMI= Body mass index is 23.8 kg/m .    HEARING: Right Ear:        500Hz:  RESPONSE- on Level:   20 db    1000 Hz: RESPONSE- on Level:   20 db    2000 Hz: RESPONSE- on Level:   20 db    4000 Hz: RESPONSE- on Level:   20 db     Left Ear:       500Hz:  RESPONSE- on Level:   20 db    1000 Hz: RESPONSE- on Level:   20 db    2000 Hz: RESPONSE- on Level:   20 db    4000 Hz: RESPONSE- on Level:   20 db     VISION:    Medication Reconciliation: complete    ASSESSMENT/PLAN:  (Z00.00) Wellness examination  (primary encounter diagnosis)  Comment: Follows at [facility], no further needs.  Plan:       COUNSELING:      reports that she has never smoked. She has never used smokeless tobacco.    Estimated body mass index is 23.8 kg/m  as calculated from the following:    Height as of this encounter: 1.651 m (5' 5\").    Weight as of this encounter: 64.9 kg (143 lb).       Counseling Resources  [employer] " [employer]  [email or web address]  Naomie Ramos, AKIN  [place] SCHOOL PROVIDER  [facility]

## 2021-04-24 ENCOUNTER — HEALTH MAINTENANCE LETTER (OUTPATIENT)
Age: 41
End: 2021-04-24

## 2021-10-09 ENCOUNTER — HEALTH MAINTENANCE LETTER (OUTPATIENT)
Age: 41
End: 2021-10-09

## 2022-04-19 NOTE — PATIENT INSTRUCTIONS
"                Josie Gamez has completed a Wellness Check at the [facility] on 10/26/2020.      ____________________________________________  [place] SCHOOL PROVIDER                                                                               Wellness Visit Recommendations:      See your regular primary care health provider every year in order to help stay healthy.    Review health changes.     Review your medicines if your doctor has prescribed any.    Talk to your provider about how often to have your cholesterol checked.    If you are at risk for diabetes, you should have a diabetes test (fasting glucose).    Shots: Get a flu shot each year. Get a tetanus shot every 10 years.     Review with your primary care provider other immunizations that you may need based on your age and/or medical/surgical history    Nutrition:     Eat at least 5 servings of fruits and vegetables each day.    Eat whole-grain bread, whole-wheat pasta and brown rice instead of white grains and rice.    Preventive Care to be reviewed by your primary care provider:    Females:        Cervical Cancer Screening                          Breast Cancer Screening                          Colon Cancer Screening  Males:             Prostrate Cancer Screening                          Colon Cancer Screening      Lifestyle:    Exercise at least 150 minutes a week (30 minutes a day, 5 days of the week). This will help you control your weight and help prevent disease or manage disease.    Limit alcohol to one drink per day or less depending on your past medical history.    No smoking.     Wear sunscreen to prevent skin cancer.    See your dentist every six months for an exam and cleaning.    Today's Vital Signs:  /60 (BP Location: Right arm, Patient Position: Sitting, Cuff Size: Adult Regular)   Pulse 60   Temp 98.2  F (36.8  C) (Tympanic)   Resp 16   Ht 1.651 m (5' 5\")   Wt 64.9 kg (143 lb)   LMP 10/14/2020 (Exact " Monroe Gonzalez Pharm[facility]IO [facility] Admin   Date)   SpO2 99%   Breastfeeding No   BMI 23.80 kg/m

## 2022-05-16 ENCOUNTER — HEALTH MAINTENANCE LETTER (OUTPATIENT)
Age: 42
End: 2022-05-16

## 2022-09-11 ENCOUNTER — HEALTH MAINTENANCE LETTER (OUTPATIENT)
Age: 42
End: 2022-09-11

## 2022-11-25 ENCOUNTER — MEDICAL CORRESPONDENCE (OUTPATIENT)
Dept: HEALTH INFORMATION MANAGEMENT | Facility: CLINIC | Age: 42
End: 2022-11-25

## 2022-11-30 ENCOUNTER — TRANSCRIBE ORDERS (OUTPATIENT)
Dept: OTHER | Age: 42
End: 2022-11-30

## 2022-11-30 ENCOUNTER — TELEPHONE (OUTPATIENT)
Dept: ORTHOPEDICS | Facility: CLINIC | Age: 42
End: 2022-11-30

## 2022-11-30 DIAGNOSIS — M89.9 BONE LESION: Primary | ICD-10-CM

## 2022-11-30 DIAGNOSIS — M25.511 ACUTE PAIN OF RIGHT SHOULDER: ICD-10-CM

## 2022-11-30 NOTE — TELEPHONE ENCOUNTER
Health Call Center    Phone Message    May a detailed message be left on voicemail: yes     Reason for Call: Appointment Intake    Referring Provider Name: Sammi Posadas NP, [facility]  To [facility]:  Dr. Iglesia Vann    Diagnosis and/or Symptoms:  acute pain of right shoulder, bone lesion right humerus    MRI done @ [facility] 11/23    Pt currently scheduled with Dr. Vann on 12/15.  Requesting to be seen sooner than that date.       Action Taken: Message routed to:  [facility] ([facility]:  Dr. Iglesia Vann    Travel Screening: Not Applicable

## 2022-11-30 NOTE — TELEPHONE ENCOUNTER
I called the patient and left a message letting them know that we could move their appointment up if she is willing to do a video visit with Dr. Siu rather than in person with Dr. Vann. I offered them a video visit at 2:30pm on 12/6/2022 with Dr. Siu and let them know to call us back to get that scheduled. The appointment is on hold for them.    Noah Travis, EMT

## 2022-12-13 ASSESSMENT — ENCOUNTER SYMPTOMS
SNORES LOUDLY: 0
POSTURAL DYSPNEA: 0
CONSTIPATION: 1
POLYDIPSIA: 1
SPEECH CHANGE: 0
HEMOPTYSIS: 0
EYE REDNESS: 1
HEMATURIA: 0
NECK PAIN: 1
WEAKNESS: 1
ABDOMINAL PAIN: 0
SEIZURES: 0
TINGLING: 1
NERVOUS/ANXIOUS: 1
VOMITING: 0
MUSCLE WEAKNESS: 1
DECREASED CONCENTRATION: 1
COUGH DISTURBING SLEEP: 0
DIZZINESS: 1
SKIN CHANGES: 0
NIGHT SWEATS: 1
WEIGHT LOSS: 0
SPUTUM PRODUCTION: 0
EYE IRRITATION: 1
COUGH: 0
EYE WATERING: 0
DIARRHEA: 1
BACK PAIN: 1
EYE PAIN: 1
SHORTNESS OF BREATH: 1
POOR WOUND HEALING: 0
PANIC: 1
INSOMNIA: 1
ARTHRALGIAS: 0
HEADACHES: 1
DYSPNEA ON EXERTION: 1
FATIGUE: 1
DYSURIA: 0
DISTURBANCES IN COORDINATION: 0
BLOATING: 1
MYALGIAS: 1
MEMORY LOSS: 0
WHEEZING: 0
POLYPHAGIA: 0
MUSCLE CRAMPS: 0
DECREASED APPETITE: 1
HOT FLASHES: 1
TREMORS: 1
RECTAL PAIN: 0
ALTERED TEMPERATURE REGULATION: 1
NAIL CHANGES: 0
JOINT SWELLING: 0
BLOOD IN STOOL: 0
BOWEL INCONTINENCE: 1
LOSS OF CONSCIOUSNESS: 0
JAUNDICE: 0
STIFFNESS: 1
WEIGHT GAIN: 0
DIFFICULTY URINATING: 0
DECREASED LIBIDO: 1
DOUBLE VISION: 1
FLANK PAIN: 0
DEPRESSION: 1
INCREASED ENERGY: 1
CHILLS: 1
FEVER: 0
HEARTBURN: 0
NAUSEA: 1
PARALYSIS: 0
HALLUCINATIONS: 0
NUMBNESS: 1

## 2022-12-13 NOTE — TELEPHONE ENCOUNTER
Action 2022 10:14 AM MT   Action Taken Sent a request to [facility] for imaging [phone].     Action 2022 4:27 PM MT   Action Taken Sent a request to [facility] for imaging [phone].     Action 2022 2:23 PM MT   Action Taken Called [facility], rep will push the MRI stat! Imaging resolved.       DIAGNOSIS:  Bone lesion [M89.9]  - Primary       Acute pain of right shoulder [M25.511]          APPOINTMENT DATE: 12/15/2022   NOTES STATUS DETAILS   OFFICE NOTE from referring provider Internal 2022 - Moritz, Katelyn NP - [facility]   MEDICATION LIST Internal    LABS     CBC/DIFF Care Everywhere 2022   MRI PACS [facility] - RT Humerus   XRAYS (IMAGES & REPORTS) PACS [facility]:  2022 - RT Shoulder

## 2022-12-15 ENCOUNTER — PREP FOR PROCEDURE (OUTPATIENT)
Dept: ORTHOPEDICS | Facility: CLINIC | Age: 42
End: 2022-12-15

## 2022-12-15 ENCOUNTER — OFFICE VISIT (OUTPATIENT)
Dept: ORTHOPEDICS | Facility: CLINIC | Age: 42
End: 2022-12-15
Payer: COMMERCIAL

## 2022-12-15 ENCOUNTER — PRE VISIT (OUTPATIENT)
Dept: ORTHOPEDICS | Facility: CLINIC | Age: 42
End: 2022-12-15

## 2022-12-15 VITALS — HEIGHT: 65 IN | WEIGHT: 136 LBS | BODY MASS INDEX: 22.66 KG/M2

## 2022-12-15 DIAGNOSIS — M89.9 BONE LESION: Primary | ICD-10-CM

## 2022-12-15 DIAGNOSIS — M25.511 ACUTE PAIN OF RIGHT SHOULDER: ICD-10-CM

## 2022-12-15 DIAGNOSIS — M89.9 BONE LESION: ICD-10-CM

## 2022-12-15 PROCEDURE — 99204 OFFICE O/P NEW MOD 45 MIN: CPT | Performed by: ORTHOPAEDIC SURGERY

## 2022-12-15 RX ORDER — CEFAZOLIN SODIUM 2 G/50ML
2 SOLUTION INTRAVENOUS
Status: CANCELLED | OUTPATIENT
Start: 2022-12-15

## 2022-12-15 RX ORDER — CITALOPRAM HYDROBROMIDE 20 MG/1
40 TABLET ORAL
COMMUNITY
Start: 2022-11-16 | End: 2024-02-19

## 2022-12-15 RX ORDER — CEFAZOLIN SODIUM 2 G/50ML
2 SOLUTION INTRAVENOUS
Status: SHIPPED | OUTPATIENT
Start: 2022-12-15

## 2022-12-15 RX ORDER — SUMATRIPTAN 100 MG/1
TABLET, FILM COATED ORAL
COMMUNITY
Start: 2022-11-16

## 2022-12-15 RX ORDER — BUPROPION HYDROCHLORIDE 300 MG/1
TABLET ORAL
COMMUNITY
Start: 2022-08-01

## 2022-12-15 RX ORDER — TRIAMCINOLONE ACETONIDE 1 MG/G
CREAM TOPICAL
COMMUNITY
Start: 2022-10-21

## 2022-12-15 RX ORDER — TOPIRAMATE 25 MG/1
50 TABLET, FILM COATED ORAL 2 TIMES DAILY
COMMUNITY
Start: 2022-11-16

## 2022-12-15 RX ORDER — ACETAMINOPHEN 325 MG/1
975 TABLET ORAL ONCE
Status: CANCELLED | OUTPATIENT
Start: 2022-12-15 | End: 2022-12-15

## 2022-12-15 ASSESSMENT — PATIENT HEALTH QUESTIONNAIRE - PHQ9: SUM OF ALL RESPONSES TO PHQ QUESTIONS 1-9: 13

## 2022-12-15 NOTE — LETTER
12/15/2022         RE: Josie Gamez  [address]  [place] [address]        Dear Colleague,    Thank you for referring your patient, Josie Gamez, to the [facility] [place]. Please see a copy of my visit note below.        [facility] Physicians, Orthopaedic Oncology Surgery Consultation  by Iglesia Vann M.D.    Josie Gamez MRN# [id number]    YOB: 1980     Requesting physician: Katelyn Moritz, NP [facility] Ref-Primary, Physician  Chante Vázquez, NP, [facility]            Assessment and Plan:   Assessment:  1. Right proximal humeral lesion extending to medial cortex but well demarcated margins.  No soft tissue extension.  May not be responsible for her symptomatology.  Differential diagnosis includes atypical cartilaginous tumor versus other benign bone etiologies.  Likelihood of malignancy is small.  2. History of anxiety and depression     Plan:  1. Advised proceeding with open biopsy and frozen section examination.  Depending upon the intraoperative findings, decision will be made as to whether not to proceed with intralesional curettage, allograft bone packing and prophylactic internal fixation.  I explained the risk benefits alternatives the patient and rationale for this recommendation.  She has an excellent understanding.  2. Proposed procedure: Open biopsy, possible curettage, allograft packing and internal fixation of right proximal humerus, 2 hours duration, tier 2, outpatient surgery.    Iglesia Vann MD  [facility] Family Professor  Oncology and Adult Reconstructive Surgery  Dept Orthopaedic Surgery, [facility] Physicians  [phone] office, [phone] pager  [email or web address]          History of Present Illness:   42 year old female  chief complaint    Patient has had complaints of vague discomfort and difficulty sleeping and on the right arm.  She has sought chiropractic and massage and other  "alternative therapies for treatment that have not been successful relieving her symptoms.  Radiographs were performed as well as subsequent MRI examination which revealed evidence of a lesion of her right proximal humeral head.  Patient is right-hand dominant.  She denies any trauma to the area.  She has been having nocturnal pain.  No constitutional symptoms.  No prior history of bone tumor or previous imaging of the area.    Current symptoms:  Problem: Right humerus bone lesion   Onset and duration: 5 months   Awakens from sleep due to sx's:  Yes  Other joints or sites painful:  Yes  Fever: No  Appetite change or weight loss: Yes  History of prior or existing cancer: No           Physical Exam:     EXAMINATION pertinent findings:   PSYCH: Pleasant, healthy-appearing, alert, oriented x3, cooperative. Normal mood and affect.  VITAL SIGNS: Height 1.651 m (5' 5\"), weight 61.7 kg (136 lb), not currently breastfeeding..  Reviewed nursing intake notes.   Body mass index is 22.63 kg/m .  RESP: non labored breathing   ABD: benign, soft, non-tender, no acute peritoneal findings  SKIN: grossly normal   LYMPHATIC: grossly normal, no adenopathy, no extremity edema  NEURO: grossly normal , no motor deficits  VASCULAR: satisfactory perfusion of all extremities .   MUSCULOSKELETAL:   Gait is normal.  She has full active and passive range of motion of the right glenohumeral joint symmetric with the left side.  Normal neck motion.  No radicular symptoms.  Radial median ulnar nerve function is normal.           Data:   All laboratory data reviewed  All imaging studies reviewed by me            DATA for DOCUMENTATION:         Past Medical History:   There is no problem list on file for this patient.    No past medical history on file.    Also see scanned health assessment forms.       Past Surgical History:   No past surgical history on file.         Social History:     Social History     Socioeconomic History     Marital status: "      Spouse name: Not on file     Number of children: Not on file     Years of education: Not on file     Highest education level: Not on file   Occupational History     Not on file   Tobacco Use     Smoking status: Never     Smokeless tobacco: Never   Substance and Sexual Activity     Alcohol use: Not on file     Drug use: Not on file     Sexual activity: Not on file   Other Topics Concern     Not on file   Social History Narrative     Not on file     Social Determinants of Health     Financial Resource Strain: Not on file   Food Insecurity: Not on file   Transportation Needs: Not on file   Physical Activity: Not on file   Stress: Not on file   Social Connections: Not on file   Intimate Partner Violence: Not on file   Housing Stability: Not on file            Family History:       Family History   Problem Relation Age of Onset     Heart Disease Father      Hypertension Father      Hyperlipidemia Father      Heart Disease Paternal Grandmother             Medications:     Current Outpatient Medications   Medication Sig     ALPRAZolam (XANAX) 0.25 MG tablet Take 0.25 mg by mouth     Ascorbic Acid 125 MG CHEW Take 2 tablets by mouth     buPROPion (WELLBUTRIN XL) 300 MG 24 hr tablet Dose change: TAKE 1 TABLET BY MOUTH EVERY DAY IN THE MORNING     citalopram (CELEXA) 20 MG tablet Take 1 Tablet (20 mg) by mouth every morning.     SUMAtriptan (IMITREX STATDOSE) 6 MG/0.5ML refill cartridge 6 mg     SUMAtriptan (IMITREX) 100 MG tablet Take1 tablet by mouth at onset of headache, with repeat dose in one hour if needed.     topiramate (TOPAMAX) 25 MG tablet Take 50 mg by mouth 2 times daily     triamcinolone (KENALOG) 0.1 % external cream Apply topically to affected area(s) two times daily.     No current facility-administered medications for this visit.              Review of Systems:   A comprehensive 10 point review of systems (constitutional, ENT, cardiac, peripheral vascular, lymphatic, respiratory, GI, ,  Musculoskeletal, skin, Neurological) was performed and found to be negative except as described in this note.     See intake form completed by patient    Answers for HPI/ROS submitted by the patient on 12/13/2022  General Symptoms: Yes  Skin Symptoms: Yes  HENT Symptoms: No  EYE SYMPTOMS: Yes  HEART SYMPTOMS: No  LUNG SYMPTOMS: Yes  INTESTINAL SYMPTOMS: Yes  URINARY SYMPTOMS: Yes  GYNECOLOGIC SYMPTOMS: Yes  BREAST SYMPTOMS: No  SKELETAL SYMPTOMS: Yes  BLOOD SYMPTOMS: No  NERVOUS SYSTEM SYMPTOMS: Yes  MENTAL HEALTH SYMPTOMS: Yes  Fever: No  Loss of appetite: Yes  Weight loss: No  Weight gain: No  Fatigue: Yes  Night sweats: Yes  Chills: Yes  Increased stress: Yes  Excessive hunger: No  Excessive thirst: Yes  Feeling hot or cold when others believe the temperature is normal: Yes  Loss of height: No  Post-operative complications: No  Surgical site pain: No  Hallucinations: No  Change in or Loss of Energy: Yes  Hyperactivity: Yes  Confusion: No  Changes in hair: Yes  Changes in moles/birth marks: No  Itching: Yes  Rashes: Yes  Changes in nails: No  Acne: No  Hair in places you don't want it: No  Change in facial hair: No  Warts: No  Non-healing sores: No  Scarring: No  Flaking of skin: No  Color changes of hands/feet in cold : No  Sun sensitivity: No  Skin thickening: No  Eye pain: Yes  Vision loss: No  Dry eyes: Yes  Watery eyes: No  Eye bulging: No  Double vision: Yes  Flashing of lights: Yes  Spots: Yes  Floaters: Yes  Redness: Yes  Crossed eyes: No  Tunnel Vision: Yes  Yellowing of eyes: No  Eye irritation: Yes  Cough: No  Sputum or phlegm: No  Coughing up blood: No  Difficulty breating or shortness of breath: Yes  Snoring: No  Wheezing: No  Difficulty breathing on exertion: Yes  Nighttime Cough: No  Difficulty breathing when lying flat: No  Heart burn or indigestion: No  Nausea: Yes  Vomiting: No  Abdominal pain: No  Bloating: Yes  Constipation: Yes  Diarrhea: Yes  Blood in stool: No  Black stools: Yes  Rectal or  Anal pain: No  Fecal incontinence: Yes  Yellowing of skin or eyes: No  Vomit with blood: No  Change in stools: Yes  Trouble holding urine or incontinence: Yes  Pain or burning: No  Trouble starting or stopping: No  Increased frequency of urination: No  Blood in urine: No  Decreased frequency of urination: No  Frequent nighttime urination: No  Flank pain: No  Difficulty emptying bladder: No  Bleeding or spotting between periods: Yes  Heavy or painful periods: No  Irregular periods: Yes  Vaginal discharge: No  Hot flashes: Yes  Vaginal dryness: Yes  Genital ulcers: No  Reduced libido: Yes  Painful intercourse: Yes  Difficulty with sexual arousal: Yes  Post-menopausal bleeding: No  Back pain: Yes  Muscle aches: Yes  Neck pain: Yes  Swollen joints: No  Joint pain: No  Bone pain: Yes  Muscle cramps: No  Muscle weakness: Yes  Joint stiffness: Yes  Bone fracture: No  Trouble with coordination: No  Dizziness or trouble with balance: Yes  Fainting or black-out spells: No  Memory loss: No  Headache: Yes  Seizures: No  Speech problems: No  Tingling: Yes  Tremor: Yes  Weakness: Yes  Difficulty walking: No  Paralysis: No  Numbness: Yes  Nervous or Anxious: Yes  Depression: Yes  Trouble sleeping: Yes  Trouble thinking or concentrating: Yes  Mood changes: Yes  Panic attacks: Yes

## 2022-12-15 NOTE — PROGRESS NOTES
[facility] Physicians, Orthopaedic Oncology Surgery Consultation  by Iglesia Vann M.D.    Josie Gamez MRN# [id number]    YOB: 1980     Requesting physician: Katelyn Moritz, NP [facility] Ref-Primary, Physician  Chante Vázquez NP, [facility]            Assessment and Plan:   Assessment:  1. Right proximal humeral lesion extending to medial cortex but well demarcated margins.  No soft tissue extension.  May not be responsible for her symptomatology.  Differential diagnosis includes atypical cartilaginous tumor versus other benign bone etiologies.  Likelihood of malignancy is small.  2. History of anxiety and depression     Plan:  1. Advised proceeding with open biopsy and frozen section examination.  Depending upon the intraoperative findings, decision will be made as to whether not to proceed with intralesional curettage, allograft bone packing and prophylactic internal fixation.  I explained the risk benefits alternatives the patient and rationale for this recommendation.  She has an excellent understanding.  2. Proposed procedure: Open biopsy, possible curettage, allograft packing and internal fixation of right proximal humerus, 2 hours duration, tier 2, outpatient surgery.    Iglesia Vann MD  [facility] Family Professor  Oncology and Adult Reconstructive Surgery  Dept Orthopaedic Surgery, [facility] Physicians  [phone] office, [phone] pager  [email or web address]          History of Present Illness:   42 year old female  chief complaint    Patient has had complaints of vague discomfort and difficulty sleeping and on the right arm.  She has sought chiropractic and massage and other alternative therapies for treatment that have not been successful relieving her symptoms.  Radiographs were performed as well as subsequent MRI examination which revealed evidence of a lesion of her right proximal humeral head.  Patient is right-hand dominant.  She denies any  "trauma to the area.  She has been having nocturnal pain.  No constitutional symptoms.  No prior history of bone tumor or previous imaging of the area.    Current symptoms:  Problem: Right humerus bone lesion   Onset and duration: 5 months   Awakens from sleep due to sx's:  Yes  Other joints or sites painful:  Yes  Fever: No  Appetite change or weight loss: Yes  History of prior or existing cancer: No           Physical Exam:     EXAMINATION pertinent findings:   PSYCH: Pleasant, healthy-appearing, alert, oriented x3, cooperative. Normal mood and affect.  VITAL SIGNS: Height 1.651 m (5' 5\"), weight 61.7 kg (136 lb), not currently breastfeeding..  Reviewed nursing intake notes.   Body mass index is 22.63 kg/m .  RESP: non labored breathing   ABD: benign, soft, non-tender, no acute peritoneal findings  SKIN: grossly normal   LYMPHATIC: grossly normal, no adenopathy, no extremity edema  NEURO: grossly normal , no motor deficits  VASCULAR: satisfactory perfusion of all extremities .   MUSCULOSKELETAL:   Gait is normal.  She has full active and passive range of motion of the right glenohumeral joint symmetric with the left side.  Normal neck motion.  No radicular symptoms.  Radial median ulnar nerve function is normal.           Data:   All laboratory data reviewed  All imaging studies reviewed by me            DATA for DOCUMENTATION:         Past Medical History:   There is no problem list on file for this patient.    No past medical history on file.    Also see scanned health assessment forms.       Past Surgical History:   No past surgical history on file.         Social History:     Social History     Socioeconomic History     Marital status:      Spouse name: Not on file     Number of children: Not on file     Years of education: Not on file     Highest education level: Not on file   Occupational History     Not on file   Tobacco Use     Smoking status: Never     Smokeless tobacco: Never   Substance and Sexual " Activity     Alcohol use: Not on file     Drug use: Not on file     Sexual activity: Not on file   Other Topics Concern     Not on file   Social History Narrative     Not on file     Social Determinants of Health     Financial Resource Strain: Not on file   Food Insecurity: Not on file   Transportation Needs: Not on file   Physical Activity: Not on file   Stress: Not on file   Social Connections: Not on file   Intimate Partner Violence: Not on file   Housing Stability: Not on file            Family History:       Family History   Problem Relation Age of Onset     Heart Disease Father      Hypertension Father      Hyperlipidemia Father      Heart Disease Paternal Grandmother             Medications:     Current Outpatient Medications   Medication Sig     ALPRAZolam (XANAX) 0.25 MG tablet Take 0.25 mg by mouth     Ascorbic Acid 125 MG CHEW Take 2 tablets by mouth     buPROPion (WELLBUTRIN XL) 300 MG 24 hr tablet Dose change: TAKE 1 TABLET BY MOUTH EVERY DAY IN THE MORNING     citalopram (CELEXA) 20 MG tablet Take 1 Tablet (20 mg) by mouth every morning.     SUMAtriptan (IMITREX STATDOSE) 6 MG/0.5ML refill cartridge 6 mg     SUMAtriptan (IMITREX) 100 MG tablet Take1 tablet by mouth at onset of headache, with repeat dose in one hour if needed.     topiramate (TOPAMAX) 25 MG tablet Take 50 mg by mouth 2 times daily     triamcinolone (KENALOG) 0.1 % external cream Apply topically to affected area(s) two times daily.     No current facility-administered medications for this visit.              Review of Systems:   A comprehensive 10 point review of systems (constitutional, ENT, cardiac, peripheral vascular, lymphatic, respiratory, GI, , Musculoskeletal, skin, Neurological) was performed and found to be negative except as described in this note.     See intake form completed by patient    Answers for HPI/ROS submitted by the patient on 12/13/2022  General Symptoms: Yes  Skin Symptoms: Yes  HENT Symptoms: No  EYE SYMPTOMS:  Yes  HEART SYMPTOMS: No  LUNG SYMPTOMS: Yes  INTESTINAL SYMPTOMS: Yes  URINARY SYMPTOMS: Yes  GYNECOLOGIC SYMPTOMS: Yes  BREAST SYMPTOMS: No  SKELETAL SYMPTOMS: Yes  BLOOD SYMPTOMS: No  NERVOUS SYSTEM SYMPTOMS: Yes  MENTAL HEALTH SYMPTOMS: Yes  Fever: No  Loss of appetite: Yes  Weight loss: No  Weight gain: No  Fatigue: Yes  Night sweats: Yes  Chills: Yes  Increased stress: Yes  Excessive hunger: No  Excessive thirst: Yes  Feeling hot or cold when others believe the temperature is normal: Yes  Loss of height: No  Post-operative complications: No  Surgical site pain: No  Hallucinations: No  Change in or Loss of Energy: Yes  Hyperactivity: Yes  Confusion: No  Changes in hair: Yes  Changes in moles/birth marks: No  Itching: Yes  Rashes: Yes  Changes in nails: No  Acne: No  Hair in places you don't want it: No  Change in facial hair: No  Warts: No  Non-healing sores: No  Scarring: No  Flaking of skin: No  Color changes of hands/feet in cold : No  Sun sensitivity: No  Skin thickening: No  Eye pain: Yes  Vision loss: No  Dry eyes: Yes  Watery eyes: No  Eye bulging: No  Double vision: Yes  Flashing of lights: Yes  Spots: Yes  Floaters: Yes  Redness: Yes  Crossed eyes: No  Tunnel Vision: Yes  Yellowing of eyes: No  Eye irritation: Yes  Cough: No  Sputum or phlegm: No  Coughing up blood: No  Difficulty breating or shortness of breath: Yes  Snoring: No  Wheezing: No  Difficulty breathing on exertion: Yes  Nighttime Cough: No  Difficulty breathing when lying flat: No  Heart burn or indigestion: No  Nausea: Yes  Vomiting: No  Abdominal pain: No  Bloating: Yes  Constipation: Yes  Diarrhea: Yes  Blood in stool: No  Black stools: Yes  Rectal or Anal pain: No  Fecal incontinence: Yes  Yellowing of skin or eyes: No  Vomit with blood: No  Change in stools: Yes  Trouble holding urine or incontinence: Yes  Pain or burning: No  Trouble starting or stopping: No  Increased frequency of urination: No  Blood in urine: No  Decreased  frequency of urination: No  Frequent nighttime urination: No  Flank pain: No  Difficulty emptying bladder: No  Bleeding or spotting between periods: Yes  Heavy or painful periods: No  Irregular periods: Yes  Vaginal discharge: No  Hot flashes: Yes  Vaginal dryness: Yes  Genital ulcers: No  Reduced libido: Yes  Painful intercourse: Yes  Difficulty with sexual arousal: Yes  Post-menopausal bleeding: No  Back pain: Yes  Muscle aches: Yes  Neck pain: Yes  Swollen joints: No  Joint pain: No  Bone pain: Yes  Muscle cramps: No  Muscle weakness: Yes  Joint stiffness: Yes  Bone fracture: No  Trouble with coordination: No  Dizziness or trouble with balance: Yes  Fainting or black-out spells: No  Memory loss: No  Headache: Yes  Seizures: No  Speech problems: No  Tingling: Yes  Tremor: Yes  Weakness: Yes  Difficulty walking: No  Paralysis: No  Numbness: Yes  Nervous or Anxious: Yes  Depression: Yes  Trouble sleeping: Yes  Trouble thinking or concentrating: Yes  Mood changes: Yes  Panic attacks: Yes

## 2022-12-15 NOTE — LETTER
12/15/2022         RE: Josie Gamez  [address]  [facility] [address]          [facility], Orthopaedic Oncology Surgery Consultation  by Iglesia Vann M.D.    Josie Gamez MRN# [id number]    YOB: 1980     Requesting physician: Katelyn Moritz, NP [facility] Ref-Primary, Physician  Chante Vázquez, NP, [facility]            Assessment and Plan:   Assessment:  1. Right proximal humeral lesion extending to medial cortex but well demarcated margins.  No soft tissue extension.  May not be responsible for her symptomatology.  Differential diagnosis includes atypical cartilaginous tumor versus other benign bone etiologies.  Likelihood of malignancy is small.  2. History of anxiety and depression     Plan:  1. Advised proceeding with open biopsy and frozen section examination.  Depending upon the intraoperative findings, decision will be made as to whether not to proceed with intralesional curettage, allograft bone packing and prophylactic internal fixation.  I explained the risk benefits alternatives the patient and rationale for this recommendation.  She has an excellent understanding.  2. Proposed procedure: Open biopsy, possible curettage, allograft packing and internal fixation of right proximal humerus, 2 hours duration, tier 2, outpatient surgery.    Iglesia Vann MD  [facility] Family Professor  Oncology and Adult Reconstructive Surgery  Dept Orthopaedic Surgery, [facility]  [phone] office, [phone] pager  [email or web address]          History of Present Illness:   42 year old female  chief complaint    Patient has had complaints of vague discomfort and difficulty sleeping and on the right arm.  She has sought chiropractic and massage and other alternative therapies for treatment that have not been successful relieving her symptoms.  Radiographs were performed as well as subsequent MRI examination which revealed evidence of a  "lesion of her right proximal humeral head.  Patient is right-hand dominant.  She denies any trauma to the area.  She has been having nocturnal pain.  No constitutional symptoms.  No prior history of bone tumor or previous imaging of the area.    Current symptoms:  Problem: Right humerus bone lesion   Onset and duration: 5 months   Awakens from sleep due to sx's:  Yes  Other joints or sites painful:  Yes  Fever: No  Appetite change or weight loss: Yes  History of prior or existing cancer: No           Physical Exam:     EXAMINATION pertinent findings:   PSYCH: Pleasant, healthy-appearing, alert, oriented x3, cooperative. Normal mood and affect.  VITAL SIGNS: Height 1.651 m (5' 5\"), weight 61.7 kg (136 lb), not currently breastfeeding..  Reviewed nursing intake notes.   Body mass index is 22.63 kg/m .  RESP: non labored breathing   ABD: benign, soft, non-tender, no acute peritoneal findings  SKIN: grossly normal   LYMPHATIC: grossly normal, no adenopathy, no extremity edema  NEURO: grossly normal , no motor deficits  VASCULAR: satisfactory perfusion of all extremities .   MUSCULOSKELETAL:   Gait is normal.  She has full active and passive range of motion of the right glenohumeral joint symmetric with the left side.  Normal neck motion.  No radicular symptoms.  Radial median ulnar nerve function is normal.           Data:   All laboratory data reviewed  All imaging studies reviewed by me            DATA for DOCUMENTATION:         Past Medical History:   There is no problem list on file for this patient.    No past medical history on file.    Also see scanned health assessment forms.       Past Surgical History:   No past surgical history on file.         Social History:     Social History     Socioeconomic History     Marital status:      Spouse name: Not on file     Number of children: Not on file     Years of education: Not on file     Highest education level: Not on file   Occupational History     Not on file "   Tobacco Use     Smoking status: Never     Smokeless tobacco: Never   Substance and Sexual Activity     Alcohol use: Not on file     Drug use: Not on file     Sexual activity: Not on file   Other Topics Concern     Not on file   Social History Narrative     Not on file     Social Determinants of Health     Financial Resource Strain: Not on file   Food Insecurity: Not on file   Transportation Needs: Not on file   Physical Activity: Not on file   Stress: Not on file   Social Connections: Not on file   Intimate Partner Violence: Not on file   Housing Stability: Not on file            Family History:       Family History   Problem Relation Age of Onset     Heart Disease Father      Hypertension Father      Hyperlipidemia Father      Heart Disease Paternal Grandmother             Medications:     Current Outpatient Medications   Medication Sig     ALPRAZolam (XANAX) 0.25 MG tablet Take 0.25 mg by mouth     Ascorbic Acid 125 MG CHEW Take 2 tablets by mouth     buPROPion (WELLBUTRIN XL) 300 MG 24 hr tablet Dose change: TAKE 1 TABLET BY MOUTH EVERY DAY IN THE MORNING     citalopram (CELEXA) 20 MG tablet Take 1 Tablet (20 mg) by mouth every morning.     SUMAtriptan (IMITREX STATDOSE) 6 MG/0.5ML refill cartridge 6 mg     SUMAtriptan (IMITREX) 100 MG tablet Take1 tablet by mouth at onset of headache, with repeat dose in one hour if needed.     topiramate (TOPAMAX) 25 MG tablet Take 50 mg by mouth 2 times daily     triamcinolone (KENALOG) 0.1 % external cream Apply topically to affected area(s) two times daily.     No current facility-administered medications for this visit.              Review of Systems:   A comprehensive 10 point review of systems (constitutional, ENT, cardiac, peripheral vascular, lymphatic, respiratory, GI, , Musculoskeletal, skin, Neurological) was performed and found to be negative except as described in this note.     See intake form completed by patient    Answers for HPI/ROS submitted by the  patient on 12/13/2022  General Symptoms: Yes  Skin Symptoms: Yes  HENT Symptoms: No  EYE SYMPTOMS: Yes  HEART SYMPTOMS: No  LUNG SYMPTOMS: Yes  INTESTINAL SYMPTOMS: Yes  URINARY SYMPTOMS: Yes  GYNECOLOGIC SYMPTOMS: Yes  BREAST SYMPTOMS: No  SKELETAL SYMPTOMS: Yes  BLOOD SYMPTOMS: No  NERVOUS SYSTEM SYMPTOMS: Yes  MENTAL HEALTH SYMPTOMS: Yes  Fever: No  Loss of appetite: Yes  Weight loss: No  Weight gain: No  Fatigue: Yes  Night sweats: Yes  Chills: Yes  Increased stress: Yes  Excessive hunger: No  Excessive thirst: Yes  Feeling hot or cold when others believe the temperature is normal: Yes  Loss of height: No  Post-operative complications: No  Surgical site pain: No  Hallucinations: No  Change in or Loss of Energy: Yes  Hyperactivity: Yes  Confusion: No  Changes in hair: Yes  Changes in moles/birth marks: No  Itching: Yes  Rashes: Yes  Changes in nails: No  Acne: No  Hair in places you don't want it: No  Change in facial hair: No  Warts: No  Non-healing sores: No  Scarring: No  Flaking of skin: No  Color changes of hands/feet in cold : No  Sun sensitivity: No  Skin thickening: No  Eye pain: Yes  Vision loss: No  Dry eyes: Yes  Watery eyes: No  Eye bulging: No  Double vision: Yes  Flashing of lights: Yes  Spots: Yes  Floaters: Yes  Redness: Yes  Crossed eyes: No  Tunnel Vision: Yes  Yellowing of eyes: No  Eye irritation: Yes  Cough: No  Sputum or phlegm: No  Coughing up blood: No  Difficulty breating or shortness of breath: Yes  Snoring: No  Wheezing: No  Difficulty breathing on exertion: Yes  Nighttime Cough: No  Difficulty breathing when lying flat: No  Heart burn or indigestion: No  Nausea: Yes  Vomiting: No  Abdominal pain: No  Bloating: Yes  Constipation: Yes  Diarrhea: Yes  Blood in stool: No  Black stools: Yes  Rectal or Anal pain: No  Fecal incontinence: Yes  Yellowing of skin or eyes: No  Vomit with blood: No  Change in stools: Yes  Trouble holding urine or incontinence: Yes  Pain or burning: No  Trouble  starting or stopping: No  Increased frequency of urination: No  Blood in urine: No  Decreased frequency of urination: No  Frequent nighttime urination: No  Flank pain: No  Difficulty emptying bladder: No  Bleeding or spotting between periods: Yes  Heavy or painful periods: No  Irregular periods: Yes  Vaginal discharge: No  Hot flashes: Yes  Vaginal dryness: Yes  Genital ulcers: No  Reduced libido: Yes  Painful intercourse: Yes  Difficulty with sexual arousal: Yes  Post-menopausal bleeding: No  Back pain: Yes  Muscle aches: Yes  Neck pain: Yes  Swollen joints: No  Joint pain: No  Bone pain: Yes  Muscle cramps: No  Muscle weakness: Yes  Joint stiffness: Yes  Bone fracture: No  Trouble with coordination: No  Dizziness or trouble with balance: Yes  Fainting or black-out spells: No  Memory loss: No  Headache: Yes  Seizures: No  Speech problems: No  Tingling: Yes  Tremor: Yes  Weakness: Yes  Difficulty walking: No  Paralysis: No  Numbness: Yes  Nervous or Anxious: Yes  Depression: Yes  Trouble sleeping: Yes  Trouble thinking or concentrating: Yes  Mood changes: Yes  Panic attacks: Yes

## 2022-12-16 NOTE — NURSING NOTE
- A call was placed to the patient. Patient did not answer phone so a voicemail was left.     - I told the patient I would like to go over pre-op education about proposed surgery:  Case Request: Open biopsy, possible curettage, allograft packing and internal fixation of right proximal humerus [[id number]]    - Call back number to clinic was given and patient was told to call back and ask for Dr. Edwar Rhodes's nurse.     12/16 @ 8:43

## 2022-12-23 ENCOUNTER — TELEPHONE (OUTPATIENT)
Dept: ORTHOPEDICS | Facility: CLINIC | Age: 42
End: 2022-12-23

## 2022-12-23 NOTE — NURSING NOTE
- A call was placed to the patient. Patient did not answer phone so a voicemail was left.      - I told the patient I would like to go over pre-op education about proposed surgery:  Case Request: Open biopsy, possible curettage, allograft packing and internal fixation of right proximal humerus [[id number]]     - Call back number to clinic was given and patient was told to call back and ask for Dr. Edwar Rhodes's nurse.     12/23 @ 12:15

## 2022-12-23 NOTE — TELEPHONE ENCOUNTER
Phoned patient to get her scheduled for surgery with Dr. Vann     Patient was unavailable,   Provided call back number in voicemail:   [phone].

## 2022-12-26 NOTE — TELEPHONE ENCOUNTER
Patient left  12/23/2022 requesting phone call back.   Routed for follow up.     Tosin Vásquez on 12/26/2022 at 4:44 PM

## 2022-12-27 NOTE — TELEPHONE ENCOUNTER
Second attempt to reach patient.   Phoned patient to get her scheduled for surgery with Dr. Vann      Patient was unavailable,   Provided call back number in voicemail:   [phone].

## 2022-12-27 NOTE — TELEPHONE ENCOUNTER
Patient is scheduled for surgery with Dr. Vann    Spoke with: Josie    Date of Surgery: 1/24/23    Location: [facility] OR    Informed patient they will need an adult  Yes    H&P: Scheduled with PCP    Pre-procedure COVID-19 Test: N/A    Additional imaging/appointments: N/A    Surgery packet: Mailed    Additional comments: N/A

## 2022-12-28 NOTE — NURSING NOTE
- A call was placed to the patient. Patient did not answer phone so a voicemail was left.      - I told the patient I would like to go over pre-op education about proposed surgery:  Case Request: Open biopsy, possible curettage, allograft packing and internal fixation of right proximal humerus [[id number]]     - Call back number to clinic was given and patient was told to call back and ask for Dr. Edwar Rhodes's nurse.      12/28 @ 8:57

## 2022-12-28 NOTE — NURSING NOTE
A call was placed to the patient and pre-op teaching was performed over the phone.    Teaching Flowsheet   Relevant Diagnosis: Pre-Op Teaching  Teaching Topic: Case Request: Open biopsy, possible curettage, allograft packing and internal fixation of right proximal humerus [[id number]]     Person(s) involved in teaching:   Patient     Motivation Level:  Asks Questions: Yes  Eager to Learn: Yes  Cooperative: Yes  Receptive (willing/able to accept information): Yes  Any cultural factors/P[employer]al[employer] beliefs that may influence understanding or compliance? No     Patient demonstrates understanding of the following:  Reason for the appointment, diagnosis and treatment plan: Yes  Knowledge of proper use of medications and conditions for which they are ordered (with special attention to potential side effects or drug interactions): Yes  Which situations necessitate calling provider and whom to contact: Yes- discussed the stoplight tool to help assist with this.      Teaching Concerns Addressed:      Proper use of surgical scrub explain and provided to patient.    Nutritional needs and diet plan: Yes  Pain management techniques: Yes  Wound Care: Yes  How and/when to access community resources: Yes     Instructional Materials Used/Given:  a surgery packet mailed by surgery scheduler.      - Important contact info/ phone numbers  - Map/ location of surgery  - Medications to avoid  - Showering instructions  - Stop light tool  -Fax number where Pre-Op should be sent    Additionally the following was discussed with patient:  - , Alexey ([phone]) will be driving the patient to surgery and staying with them for 24 hours.        -Next step: schedule a Pre-Op appointment with PCP    Time spent with patient: 15 minutes.

## 2023-01-17 DIAGNOSIS — M89.9 BONE LESION: Primary | ICD-10-CM

## 2023-01-17 NOTE — PROGRESS NOTES
SURGERY PLAN (PRE-OP PLAN)     Patient Position (indicated by x):  x  Supine      Supine with torso rolled up on a bump      Floppy lateral on torso length bean bag      Lateral decubitus, bean bag, full length      Lateral decubitus, Wixson hip positioner      Safety paddle side supports x 2 clamped to side rail      Lithotomy, both legs in yellow padded leg holloway      Lithotomy, single leg in yellow padded leg holloway      Prone on blanket rolls/round gel pad      Prone on Thony (arched) frame on Burke table      Single thigh in orange arthroscopy clamp      Beach chair semi recumbent      Spider limb positioner      Arm out on radiolucent arm table      Split drape with top bar      Revision CHANDU drape with plastic side bags for leg      Extremity drape   x  Shoulder pack drape      Laparotomy drape      Leonard catheter            General Equipment Requests (indicated by x):    x  C-Arm with C-Armor drape      C-Arm (video capable, Wave Broadband00 model)      O-Arm with Stealth imaging      Fracture Table      Burke XR Table   x  SurgiGraphic 6000 (diving board) fluoro table      Cell saver      Vann Biopsy trephine set w/ K-wire & pituitary rongeurs   x  Small pituitary rongeur   x  Edwar's angled curettes, narrow shaft   x  Bone graft, kapner gouges   x  Midas Doug Medtronic rajendra, electric motor   x  Bone graft   x  Flexible tamps      Vancomycin 1 gram powder      Zometa 4 mg vials      Depo Medrol steroid      Blunt Pelvic Retractor (.55, Blunt Hohmann with  slight bend)      (1) Portable hand held radiation detector machine for sentinel node biopsy and (2) Lymphazurin   x Lambotte Osteotomes   x  Drill   x  Currettes   x  Tamps   x  Headlight    x  Separate Specimen cups for bone and specimen      Trauma/Fixation Requests (indicated by x):  x  Carbo-Fix proximal humerus plate      Small Frag AO plates      Locking periarticular plates - AO   x   Locking periarticular prox humerus plate - Smith/Nephew       Pelvic recon plates (curved & straight), 3.5 & 4.5 mm      Marcelle tomi-prosthetic fracture plate      DHS hip screw      Celestino Gamma nail      AO, DCS 95 degree plate/screw      AO, 95 degree condylar blade plate      AO, 3.0, 3.5, 4.0, 4.5 mm Cannulated screws      AO, 6.5, 7.0, 7.3 mm Cannulated screws- Stainless Steel      AO, 6.5 mm Cannulated screws- Titanium      Marcelle cerclage cable plates      AO IM nails      AO Large Ex Fix      AO Small Ex Fix      Large pelvic bone clamps      Large fx reduction forcepts - AO      Bone clamps - Large (Glenn Turk, Yefri)      Bone clamps - Medium (Burke)      Specimens and cultures (indicated by x):      Tissue cultures, aerobic and anaerobic without gram stain    x  Frozen section   x  pathology specimens - fresh   x  pathology specimens - formalin      Cherie Conley MD  Adult Reconstructive Surgery Fellow  Department of Orthopaedic Surgery, [facility]

## 2023-01-20 ENCOUNTER — ANCILLARY PROCEDURE (OUTPATIENT)
Dept: CT IMAGING | Facility: CLINIC | Age: 43
End: 2023-01-20
Attending: PHYSICIAN ASSISTANT
Payer: COMMERCIAL

## 2023-01-20 DIAGNOSIS — M89.9 BONE LESION: ICD-10-CM

## 2023-01-20 PROCEDURE — 73202 CT UPPR EXTREMITY W/O&W/DYE: CPT | Mod: RT | Performed by: RADIOLOGY

## 2023-01-20 RX ORDER — IOPAMIDOL 755 MG/ML
100 INJECTION, SOLUTION INTRAVASCULAR ONCE
Status: COMPLETED | OUTPATIENT
Start: 2023-01-20 | End: 2023-01-20

## 2023-01-20 RX ADMIN — IOPAMIDOL 100 ML: 755 INJECTION, SOLUTION INTRAVASCULAR at 15:38

## 2023-01-24 ENCOUNTER — APPOINTMENT (OUTPATIENT)
Dept: GENERAL RADIOLOGY | Facility: CLINIC | Age: 43
End: 2023-01-24
Attending: ORTHOPAEDIC SURGERY
Payer: COMMERCIAL

## 2023-01-24 ENCOUNTER — HOSPITAL ENCOUNTER (OUTPATIENT)
Facility: CLINIC | Age: 43
Discharge: HOME | End: 2023-01-24
Attending: ORTHOPAEDIC SURGERY | Admitting: ORTHOPAEDIC SURGERY
Payer: COMMERCIAL

## 2023-01-24 ENCOUNTER — ANESTHESIA (OUTPATIENT)
Dept: SURGERY | Facility: CLINIC | Age: 43
End: 2023-01-24
Payer: COMMERCIAL

## 2023-01-24 ENCOUNTER — ANESTHESIA EVENT (OUTPATIENT)
Dept: SURGERY | Facility: CLINIC | Age: 43
End: 2023-01-24
Payer: COMMERCIAL

## 2023-01-24 VITALS
OXYGEN SATURATION: 97 % | BODY MASS INDEX: 23.15 KG/M2 | HEIGHT: 64 IN | HEART RATE: 111 BPM | TEMPERATURE: 97.9 F | SYSTOLIC BLOOD PRESSURE: 108 MMHG | WEIGHT: 135.58 LBS | DIASTOLIC BLOOD PRESSURE: 75 MMHG | RESPIRATION RATE: 18 BRPM

## 2023-01-24 DIAGNOSIS — Z98.890 S/P ORIF (OPEN REDUCTION INTERNAL FIXATION) FRACTURE: Primary | ICD-10-CM

## 2023-01-24 DIAGNOSIS — Z87.81 S/P ORIF (OPEN REDUCTION INTERNAL FIXATION) FRACTURE: Primary | ICD-10-CM

## 2023-01-24 PROCEDURE — 20245 BONE BIOPSY OPEN DEEP: CPT | Mod: RT | Performed by: ORTHOPAEDIC SURGERY

## 2023-01-24 PROCEDURE — 250N000011 HC RX IP 250 OP 636: Performed by: PHYSICIAN ASSISTANT

## 2023-01-24 PROCEDURE — 272N000001 HC OR GENERAL SUPPLY STERILE: Performed by: ORTHOPAEDIC SURGERY

## 2023-01-24 PROCEDURE — C1762 CONN TISS, HUMAN(INC FASCIA): HCPCS | Performed by: ORTHOPAEDIC SURGERY

## 2023-01-24 PROCEDURE — 250N000009 HC RX 250: Performed by: NURSE ANESTHETIST, CERTIFIED REGISTERED

## 2023-01-24 PROCEDURE — 258N000003 HC RX IP 258 OP 636: Performed by: NURSE ANESTHETIST, CERTIFIED REGISTERED

## 2023-01-24 PROCEDURE — 23491 REINFORCE SHOULDER BONES: CPT | Mod: RT | Performed by: ORTHOPAEDIC SURGERY

## 2023-01-24 PROCEDURE — 88311 DECALCIFY TISSUE: CPT | Mod: TC | Performed by: ORTHOPAEDIC SURGERY

## 2023-01-24 PROCEDURE — 23156 REMOVAL OF HUMERUS LESION: CPT | Mod: RT | Performed by: ORTHOPAEDIC SURGERY

## 2023-01-24 PROCEDURE — 250N000011 HC RX IP 250 OP 636: Performed by: NURSE ANESTHETIST, CERTIFIED REGISTERED

## 2023-01-24 PROCEDURE — 250N000013 HC RX MED GY IP 250 OP 250 PS 637: Performed by: STUDENT IN AN ORGANIZED HEALTH CARE EDUCATION/TRAINING PROGRAM

## 2023-01-24 PROCEDURE — 999N000065 XR SHOULDER RIGHT PORT G/E 2 VIEWS: Mod: RT

## 2023-01-24 PROCEDURE — 88311 DECALCIFY TISSUE: CPT | Mod: 26 | Performed by: PATHOLOGY

## 2023-01-24 PROCEDURE — 250N000009 HC RX 250: Performed by: ORTHOPAEDIC SURGERY

## 2023-01-24 PROCEDURE — 710N000012 HC RECOVERY PHASE 2, PER MINUTE: Performed by: ORTHOPAEDIC SURGERY

## 2023-01-24 PROCEDURE — C1713 ANCHOR/SCREW BN/BN,TIS/BN: HCPCS | Performed by: ORTHOPAEDIC SURGERY

## 2023-01-24 PROCEDURE — 999N000180 XR SURGERY CARM FLUORO LESS THAN 5 MIN

## 2023-01-24 PROCEDURE — 250N000011 HC RX IP 250 OP 636: Performed by: STUDENT IN AN ORGANIZED HEALTH CARE EDUCATION/TRAINING PROGRAM

## 2023-01-24 PROCEDURE — 272N000002 HC OR SUPPLY OTHER OPNP: Performed by: ORTHOPAEDIC SURGERY

## 2023-01-24 PROCEDURE — 360N000084 HC SURGERY LEVEL 4 W/ FLUORO, PER MIN: Performed by: ORTHOPAEDIC SURGERY

## 2023-01-24 PROCEDURE — 999N000141 HC STATISTIC PRE-PROCEDURE NURSING ASSESSMENT: Performed by: ORTHOPAEDIC SURGERY

## 2023-01-24 PROCEDURE — 370N000017 HC ANESTHESIA TECHNICAL FEE, PER MIN: Performed by: ORTHOPAEDIC SURGERY

## 2023-01-24 PROCEDURE — 710N000010 HC RECOVERY PHASE 1, LEVEL 2, PER MIN: Performed by: ORTHOPAEDIC SURGERY

## 2023-01-24 PROCEDURE — 250N000025 HC SEVOFLURANE, PER MIN: Performed by: ORTHOPAEDIC SURGERY

## 2023-01-24 PROCEDURE — 88307 TISSUE EXAM BY PATHOLOGIST: CPT | Mod: 26 | Performed by: PATHOLOGY

## 2023-01-24 DEVICE — GRAFT BONE CHIPS CANC CUBE 30CC 100330: Type: IMPLANTABLE DEVICE | Site: HUMERUS | Status: FUNCTIONAL

## 2023-01-24 DEVICE — GRAFT BN CANC 30CC CRSH 1-10MM 800104: Type: IMPLANTABLE DEVICE | Site: HUMERUS | Status: FUNCTIONAL

## 2023-01-24 RX ORDER — OXYCODONE HCL 5 MG/5 ML
5 SOLUTION, ORAL ORAL EVERY 4 HOURS PRN
Status: DISCONTINUED | OUTPATIENT
Start: 2023-01-24 | End: 2023-01-25 | Stop reason: HOSPADM

## 2023-01-24 RX ORDER — BUPIVACAINE HYDROCHLORIDE AND EPINEPHRINE 2.5; 5 MG/ML; UG/ML
INJECTION, SOLUTION INFILTRATION; PERINEURAL PRN
Status: DISCONTINUED | OUTPATIENT
Start: 2023-01-24 | End: 2023-01-24 | Stop reason: HOSPADM

## 2023-01-24 RX ORDER — SODIUM CHLORIDE, SODIUM LACTATE, POTASSIUM CHLORIDE, CALCIUM CHLORIDE 600; 310; 30; 20 MG/100ML; MG/100ML; MG/100ML; MG/100ML
INJECTION, SOLUTION INTRAVENOUS CONTINUOUS PRN
Status: DISCONTINUED | OUTPATIENT
Start: 2023-01-24 | End: 2023-01-24

## 2023-01-24 RX ORDER — DEXAMETHASONE SODIUM PHOSPHATE 4 MG/ML
INJECTION, SOLUTION INTRA-ARTICULAR; INTRALESIONAL; INTRAMUSCULAR; INTRAVENOUS; SOFT TISSUE PRN
Status: DISCONTINUED | OUTPATIENT
Start: 2023-01-24 | End: 2023-01-24

## 2023-01-24 RX ORDER — ONDANSETRON 2 MG/ML
INJECTION INTRAMUSCULAR; INTRAVENOUS PRN
Status: DISCONTINUED | OUTPATIENT
Start: 2023-01-24 | End: 2023-01-24

## 2023-01-24 RX ORDER — MEPERIDINE HYDROCHLORIDE 25 MG/ML
12.5 INJECTION INTRAMUSCULAR; INTRAVENOUS; SUBCUTANEOUS EVERY 5 MIN PRN
Status: DISCONTINUED | OUTPATIENT
Start: 2023-01-24 | End: 2023-01-25 | Stop reason: HOSPADM

## 2023-01-24 RX ORDER — SODIUM CHLORIDE, SODIUM LACTATE, POTASSIUM CHLORIDE, CALCIUM CHLORIDE 600; 310; 30; 20 MG/100ML; MG/100ML; MG/100ML; MG/100ML
INJECTION, SOLUTION INTRAVENOUS CONTINUOUS
Status: DISCONTINUED | OUTPATIENT
Start: 2023-01-24 | End: 2023-01-25 | Stop reason: HOSPADM

## 2023-01-24 RX ORDER — LABETALOL HYDROCHLORIDE 5 MG/ML
10 INJECTION, SOLUTION INTRAVENOUS
Status: DISCONTINUED | OUTPATIENT
Start: 2023-01-24 | End: 2023-01-25 | Stop reason: HOSPADM

## 2023-01-24 RX ORDER — AMOXICILLIN 250 MG
1-2 CAPSULE ORAL 2 TIMES DAILY
Qty: 30 TABLET | Refills: 0 | Status: SHIPPED | OUTPATIENT
Start: 2023-01-24 | End: 2023-07-13

## 2023-01-24 RX ORDER — OXYCODONE HCL 5 MG/5 ML
10 SOLUTION, ORAL ORAL EVERY 4 HOURS PRN
Status: DISCONTINUED | OUTPATIENT
Start: 2023-01-24 | End: 2023-01-25 | Stop reason: HOSPADM

## 2023-01-24 RX ORDER — ONDANSETRON 2 MG/ML
4 INJECTION INTRAMUSCULAR; INTRAVENOUS EVERY 30 MIN PRN
Status: DISCONTINUED | OUTPATIENT
Start: 2023-01-24 | End: 2023-01-25 | Stop reason: HOSPADM

## 2023-01-24 RX ORDER — CEFAZOLIN SODIUM/WATER 2 G/20 ML
2 SYRINGE (ML) INTRAVENOUS
Status: COMPLETED | OUTPATIENT
Start: 2023-01-24 | End: 2023-01-24

## 2023-01-24 RX ORDER — ACETAMINOPHEN 325 MG/1
650 TABLET ORAL EVERY 4 HOURS PRN
Qty: 50 TABLET | Refills: 0 | Status: SHIPPED | OUTPATIENT
Start: 2023-01-24 | End: 2023-07-13

## 2023-01-24 RX ORDER — FENTANYL CITRATE 50 UG/ML
25 INJECTION, SOLUTION INTRAMUSCULAR; INTRAVENOUS EVERY 5 MIN PRN
Status: DISCONTINUED | OUTPATIENT
Start: 2023-01-24 | End: 2023-01-25 | Stop reason: HOSPADM

## 2023-01-24 RX ORDER — DIMENHYDRINATE 50 MG/ML
25 INJECTION, SOLUTION INTRAMUSCULAR; INTRAVENOUS
Status: DISCONTINUED | OUTPATIENT
Start: 2023-01-24 | End: 2023-01-25 | Stop reason: HOSPADM

## 2023-01-24 RX ORDER — HYDROMORPHONE HYDROCHLORIDE 1 MG/ML
0.4 INJECTION, SOLUTION INTRAMUSCULAR; INTRAVENOUS; SUBCUTANEOUS EVERY 5 MIN PRN
Status: DISCONTINUED | OUTPATIENT
Start: 2023-01-24 | End: 2023-01-25 | Stop reason: HOSPADM

## 2023-01-24 RX ORDER — HYDROMORPHONE HYDROCHLORIDE 1 MG/ML
0.2 INJECTION, SOLUTION INTRAMUSCULAR; INTRAVENOUS; SUBCUTANEOUS EVERY 5 MIN PRN
Status: DISCONTINUED | OUTPATIENT
Start: 2023-01-24 | End: 2023-01-25 | Stop reason: HOSPADM

## 2023-01-24 RX ORDER — HYDRALAZINE HYDROCHLORIDE 20 MG/ML
2.5-5 INJECTION INTRAMUSCULAR; INTRAVENOUS EVERY 10 MIN PRN
Status: DISCONTINUED | OUTPATIENT
Start: 2023-01-24 | End: 2023-01-25 | Stop reason: HOSPADM

## 2023-01-24 RX ORDER — ONDANSETRON 4 MG/1
4 TABLET, ORALLY DISINTEGRATING ORAL EVERY 30 MIN PRN
Status: DISCONTINUED | OUTPATIENT
Start: 2023-01-24 | End: 2023-01-25 | Stop reason: HOSPADM

## 2023-01-24 RX ORDER — FENTANYL CITRATE 50 UG/ML
INJECTION, SOLUTION INTRAMUSCULAR; INTRAVENOUS PRN
Status: DISCONTINUED | OUTPATIENT
Start: 2023-01-24 | End: 2023-01-24

## 2023-01-24 RX ORDER — EPHEDRINE SULFATE 50 MG/ML
INJECTION, SOLUTION INTRAMUSCULAR; INTRAVENOUS; SUBCUTANEOUS PRN
Status: DISCONTINUED | OUTPATIENT
Start: 2023-01-24 | End: 2023-01-24

## 2023-01-24 RX ORDER — OXYCODONE HYDROCHLORIDE 5 MG/1
5 TABLET ORAL EVERY 6 HOURS PRN
Qty: 26 TABLET | Refills: 0 | Status: SHIPPED | OUTPATIENT
Start: 2023-01-24 | End: 2023-02-06

## 2023-01-24 RX ORDER — HALOPERIDOL 5 MG/ML
1 INJECTION INTRAMUSCULAR
Status: DISCONTINUED | OUTPATIENT
Start: 2023-01-24 | End: 2023-01-25 | Stop reason: HOSPADM

## 2023-01-24 RX ORDER — PROPOFOL 10 MG/ML
INJECTION, EMULSION INTRAVENOUS PRN
Status: DISCONTINUED | OUTPATIENT
Start: 2023-01-24 | End: 2023-01-24

## 2023-01-24 RX ORDER — DEXMEDETOMIDINE HYDROCHLORIDE 4 UG/ML
INJECTION, SOLUTION INTRAVENOUS PRN
Status: DISCONTINUED | OUTPATIENT
Start: 2023-01-24 | End: 2023-01-24

## 2023-01-24 RX ORDER — ACETAMINOPHEN 325 MG/1
975 TABLET ORAL ONCE
Status: DISCONTINUED | OUTPATIENT
Start: 2023-01-24 | End: 2023-01-25 | Stop reason: HOSPADM

## 2023-01-24 RX ORDER — ACETAMINOPHEN 325 MG/1
975 TABLET ORAL ONCE
Status: DISCONTINUED | OUTPATIENT
Start: 2023-01-24 | End: 2023-01-24 | Stop reason: HOSPADM

## 2023-01-24 RX ORDER — LIDOCAINE HYDROCHLORIDE 20 MG/ML
INJECTION, SOLUTION INFILTRATION; PERINEURAL PRN
Status: DISCONTINUED | OUTPATIENT
Start: 2023-01-24 | End: 2023-01-24

## 2023-01-24 RX ORDER — FENTANYL CITRATE 50 UG/ML
50 INJECTION, SOLUTION INTRAMUSCULAR; INTRAVENOUS EVERY 5 MIN PRN
Status: DISCONTINUED | OUTPATIENT
Start: 2023-01-24 | End: 2023-01-25 | Stop reason: HOSPADM

## 2023-01-24 RX ADMIN — OXYCODONE HYDROCHLORIDE 10 MG: 5 SOLUTION ORAL at 20:22

## 2023-01-24 RX ADMIN — DEXMEDETOMIDINE 12 MCG: 100 INJECTION, SOLUTION, CONCENTRATE INTRAVENOUS at 17:06

## 2023-01-24 RX ADMIN — Medication 40 MG: at 16:30

## 2023-01-24 RX ADMIN — Medication 2 G: at 16:30

## 2023-01-24 RX ADMIN — HYDROMORPHONE HYDROCHLORIDE 0.2 MG: 1 INJECTION, SOLUTION INTRAMUSCULAR; INTRAVENOUS; SUBCUTANEOUS at 20:51

## 2023-01-24 RX ADMIN — PHENYLEPHRINE HYDROCHLORIDE 0.3 MCG/KG/MIN: 10 INJECTION INTRAVENOUS at 17:55

## 2023-01-24 RX ADMIN — SODIUM CHLORIDE, POTASSIUM CHLORIDE, SODIUM LACTATE AND CALCIUM CHLORIDE: 600; 310; 30; 20 INJECTION, SOLUTION INTRAVENOUS at 17:30

## 2023-01-24 RX ADMIN — ONDANSETRON 4 MG: 2 INJECTION INTRAMUSCULAR; INTRAVENOUS at 18:45

## 2023-01-24 RX ADMIN — DEXMEDETOMIDINE 4 MCG: 100 INJECTION, SOLUTION, CONCENTRATE INTRAVENOUS at 18:07

## 2023-01-24 RX ADMIN — FENTANYL CITRATE 100 MCG: 50 INJECTION, SOLUTION INTRAMUSCULAR; INTRAVENOUS at 16:25

## 2023-01-24 RX ADMIN — LIDOCAINE HYDROCHLORIDE 80 MG: 20 INJECTION, SOLUTION INFILTRATION; PERINEURAL at 16:27

## 2023-01-24 RX ADMIN — PROPOFOL 50 MG: 10 INJECTION, EMULSION INTRAVENOUS at 16:28

## 2023-01-24 RX ADMIN — Medication 5 MG: at 18:39

## 2023-01-24 RX ADMIN — Medication 5 MG: at 17:47

## 2023-01-24 RX ADMIN — Medication 5 MG: at 17:23

## 2023-01-24 RX ADMIN — DEXMEDETOMIDINE 4 MCG: 100 INJECTION, SOLUTION, CONCENTRATE INTRAVENOUS at 19:00

## 2023-01-24 RX ADMIN — PROPOFOL 100 MG: 10 INJECTION, EMULSION INTRAVENOUS at 16:30

## 2023-01-24 RX ADMIN — PHENYLEPHRINE HYDROCHLORIDE 0.2 MCG/KG/MIN: 10 INJECTION INTRAVENOUS at 18:40

## 2023-01-24 RX ADMIN — MIDAZOLAM 2 MG: 1 INJECTION INTRAMUSCULAR; INTRAVENOUS at 16:17

## 2023-01-24 RX ADMIN — FENTANYL CITRATE 25 MCG: 50 INJECTION INTRAMUSCULAR; INTRAVENOUS at 19:46

## 2023-01-24 RX ADMIN — SUGAMMADEX 200 MG: 100 INJECTION, SOLUTION INTRAVENOUS at 19:14

## 2023-01-24 RX ADMIN — SODIUM CHLORIDE, POTASSIUM CHLORIDE, SODIUM LACTATE AND CALCIUM CHLORIDE: 600; 310; 30; 20 INJECTION, SOLUTION INTRAVENOUS at 16:18

## 2023-01-24 RX ADMIN — PHENYLEPHRINE HYDROCHLORIDE 100 MCG: 10 INJECTION INTRAVENOUS at 16:28

## 2023-01-24 RX ADMIN — Medication 5 MG: at 17:35

## 2023-01-24 RX ADMIN — HYDROMORPHONE HYDROCHLORIDE 0.5 MG: 1 INJECTION, SOLUTION INTRAMUSCULAR; INTRAVENOUS; SUBCUTANEOUS at 18:08

## 2023-01-24 RX ADMIN — DEXAMETHASONE SODIUM PHOSPHATE 4 MG: 4 INJECTION, SOLUTION INTRA-ARTICULAR; INTRALESIONAL; INTRAMUSCULAR; INTRAVENOUS; SOFT TISSUE at 16:48

## 2023-01-24 RX ADMIN — PHENYLEPHRINE HYDROCHLORIDE 100 MCG: 10 INJECTION INTRAVENOUS at 17:11

## 2023-01-24 RX ADMIN — Medication 5 MG: at 17:29

## 2023-01-24 RX ADMIN — FENTANYL CITRATE 25 MCG: 50 INJECTION INTRAMUSCULAR; INTRAVENOUS at 20:19

## 2023-01-24 RX ADMIN — FENTANYL CITRATE 25 MCG: 50 INJECTION INTRAMUSCULAR; INTRAVENOUS at 19:39

## 2023-01-24 RX ADMIN — ONDANSETRON 4 MG: 2 INJECTION INTRAMUSCULAR; INTRAVENOUS at 20:07

## 2023-01-24 RX ADMIN — FENTANYL CITRATE 25 MCG: 50 INJECTION INTRAMUSCULAR; INTRAVENOUS at 19:52

## 2023-01-24 RX ADMIN — PHENYLEPHRINE HYDROCHLORIDE 100 MCG: 10 INJECTION INTRAVENOUS at 17:18

## 2023-01-24 ASSESSMENT — LIFESTYLE VARIABLES: TOBACCO_USE: 0

## 2023-01-24 ASSESSMENT — ACTIVITIES OF DAILY LIVING (ADL)
ADLS_ACUITY_SCORE: 35

## 2023-01-24 NOTE — ANESTHESIA PROCEDURE NOTES
Airway         Procedure Start/Stop Times: 1/24/2023 4:35 PM  Staff -        Anesthesiologist:  Nena Taylor MD       CRNA: Michael Walden APRN CRNA       Performed By: CRNAIndications and Patient Condition       Indications for airway management: tomi-procedural       Induction type:intravenous       Mask difficulty assessment: 1 - vent by mask    Final Airway Details       Final airway type: endotracheal airway       Successful airway: ETT - single  Endotracheal Airway Details        ETT size (mm): 7.0       Cuffed: yes       Successful intubation technique: direct laryngoscopy       DL Blade Type: MAC 1       Grade View of Cords: 1       Adjucts: stylet       Position: Left       Measured from: gums/teeth       Secured at (cm): 20       Bite block used: None    Post intubation assessment        Placement verified by: capnometry        Number of attempts at approach: 1       Secured with: silk tape       Ease of procedure: easy       Dentition: Intact and Unchanged    Medication(s) Administered   Medication Administration Time: 1/24/2023 4:35 PM

## 2023-01-24 NOTE — ANESTHESIA PREPROCEDURE EVALUATION
Anesthesia Pre-Procedure Evaluation    Patient: Josie Gamez   MRN: [id number] : 1980        Procedure : Procedure(s):  Open Biopsy, Possible Curettage, Allograft Packing  Internal Fixation of Right Proximal Humerus          History reviewed. No pertinent past medical history.   History reviewed. No pertinent surgical history.   Allergies   Allergen Reactions     Amoxicillin-Pot Clavulanate Hives     Azithromycin Hives     Cefdinir Hives     Clindamycin Hives     Doxycycline      Cephalexin Hives      Social History     Tobacco Use     Smoking status: Never     Smokeless tobacco: Never   Substance Use Topics     Alcohol use: Not on file      Wt Readings from Last 1 Encounters:   23 61.5 kg (135 lb 9.3 oz)        Anesthesia Evaluation   Pt has had prior anesthetic.     No history of anesthetic complications       ROS/MED HX  ENT/Pulmonary:  - neg pulmonary ROS  (-) tobacco use   Neurologic:     (+) migraines,     Cardiovascular:  - neg cardiovascular ROS     METS/Exercise Tolerance:     Hematologic:  - neg hematologic  ROS     Musculoskeletal: Comment:   Prior carpal tunel sx 2019  Now with right shoulder/arm pain. 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       GI/Hepatic:  - neg GI/hepatic ROS     Renal/Genitourinary:  - neg Renal ROS     Endo:  - neg endo ROS     Psychiatric/Substance Use: Comment: Seasonal affective disorder  Panic attacks  Generalized anxiety disorder    (+) psychiatric history anxiety and depression     Infectious Disease:  - neg infectious disease ROS     Malignancy:  - neg malignancy ROS     Other:            Physical Exam    Airway  airway exam normal           Respiratory Devices and Support         Dental    unable to assess        Cardiovascular   cardiovascular exam normal          Pulmonary   pulmonary exam normal                OUTSIDE LABS:  CBC: No results found for: WBC, HGB, HCT, PLT  BMP: No results found for: NA, POTASSIUM, CHLORIDE,  CO2, BUN, CR, GLC  COAGS: No results found for: PTT, INR, FIBR  POC: No results found for: BGM, HCG, HCGS  HEPATIC: No results found for: ALBUMIN, PROTTOTAL, ALT, AST, GGT, ALKPHOS, BILITOTAL, BILIDIRECT, ALHAJI  OTHER: No results found for: PH, LACT, A1C, KALPANA, PHOS, MAG, LIPASE, AMYLASE, TSH, T4, T3, CRP, SED    Anesthesia Plan    ASA Status:  2   NPO Status:  NPO Appropriate    Anesthesia Type: General.     - Airway: LMA              Consents    Anesthesia Plan(s) and associated risks, benefits, and realistic alternatives discussed. Questions answered and patient/representative(s) expressed understanding.    - Discussed:     - Discussed with:  Patient      - Extended Intubation/Ventilatory Support Discussed: No.      - Patient is DNR/DNI Status: No    Use of blood products discussed: No .     Postoperative Care    Pain management: Oral pain medications, IV analgesics, Multi-modal analgesia.   PONV prophylaxis: Ondansetron (or other 5HT-3), Dexamethasone or Solumedrol     Comments:           H&P reviewed: Unable to attach H&P to encounter due to EHR limitations. H&P Update: appropriate H&P reviewed, patient examined. No interval changes since H&P (within 30 days).         Nena Roman MD

## 2023-01-25 NOTE — BRIEF OP NOTE
[facility]    Brief Operative Note    Pre-operative diagnosis: Bone lesion [M89.9]  Post-operative diagnosis Same as pre-operative diagnosis    Procedure: Procedure(s):  Open Biopsy, Curettage, Allograft Packing  Internal Fixation of Right Proximal Humerus  Surgeon: Surgeon(s) and Role:     * Iglesia Vann MD - Primary     * Harjinder Villatoro PA-C - Assisting     * Surjit Molina MD - Resident - Assisting  Anesthesia: General   Estimated Blood Loss: 100 mL    Drains: None  Specimens:   ID Type Source Tests Collected by Time Destination   1 : Right Humerus Bone Lesion Tissue Humerus, Right SURGICAL PATHOLOGY EXAM Iglesia Vann MD 1/24/2023  5:49 PM      Findings:   Gross appearance of cartilaginous tumor, proceeded with curettage, impaction grafting, prophylactic fixation.  Complications: None.  Implants:   Implant Name Type Inv. Item Serial No.  Lot No. LRB No. Used Action   GRAFT BN CAN 30CC CRS 1-10MM [id number] - [id number] Bone/Tissue/Biologic GRAFT BN CANC 30CC CRS 1-10MM [id number] [employer]  Right 1 Implanted   GRAFT BONE CHIPS CANC CUBE 30CC [id number] - [id number] Bone/Tissue/Biologic GRAFT BONE CHIPS CANC CUBE 30CC [id number] [employer], [facility]  Right 1 Implanted   CarboFix Proximal Humerus Plate 102mm    EXACTECH [id number] Right 1 Implanted       41yo F with R proximal humerus bone lesion x5 months with associated vague discomfort now s/p open right proximal humerus bone biopsy, currettage, impaction grafting, prophylactic fixation on 1/24/2023 with Dr. Vann    Activity: A/PROM RUE as tolerated.  Weight bearing status: Coffee-cup weight bearing RUE until orthopaedic follow-up.  Antibiotics: Ancef perioperatively complete  Diet: Begin with clear fluids and progress diet as tolerated.  DVT prophylaxis: Ambulation  Bracing/Splinting: N/a  Elevation: Elevate RUE as much as possible.  Wound Care:  Steri-strips, silver alginate, tegaderm to remain c/d/i  Pain management: OTC pain medication prn. Oxycodone prescription at discharge  X-rays: R shoulder XR in PACU    Follow-up: Clinic with Harjinder Villatoro PA-C on 2/6/2023    Disposition: Discharge from PACU once meets criteria    Surjit Molina MD  Orthopaedic Surgery PGY-4  [phone]    Please page me at [phone] with any questions/concerns. If there is no response, if it is a weekend, or if it is during evening hours then please page the orthopaedic surgery resident on call.

## 2023-01-25 NOTE — ANESTHESIA CARE TRANSFER NOTE
Patient: Josie Gamez    Procedure: Procedure(s):  Open Biopsy, Curettage, Allograft Packing  Internal Fixation of Right Proximal Humerus       Diagnosis: Bone lesion [M89.9]  Diagnosis Additional Information: No value filed.    Anesthesia Type:   General     Note:    Oropharynx: oropharynx clear of all foreign objects and spontaneously breathing  Level of Consciousness: drowsy  Oxygen Supplementation: face mask  Level of Supplemental Oxygen (L/min / FiO2): 8  Independent Airway: airway patency satisfactory and stable  Dentition: dentition unchanged  Vital Signs Stable: post-procedure vital signs reviewed and stable  Report to RN Given: handoff report given  Patient transferred to: PACU    Handoff Report: Identifed the Patient, Identified the Reponsible Provider, Reviewed the pertinent medical history, Discussed the surgical course, Reviewed Intra-OP anesthesia mangement and issues during anesthesia, Set expectations for post-procedure period and Allowed opportunity for questions and acknowledgement of understanding      Vitals:  Vitals Value Taken Time   /80 01/24/23 1923   Temp 37.9C    Pulse 129 01/24/23 1923   Resp 14    SpO2 98 % 01/24/23 1924   Vitals shown include unvalidated device data.    Electronically Signed By: MARY JANE Rios CRNA  January 24, 2023  7:25 PM

## 2023-01-25 NOTE — ANESTHESIA POSTPROCEDURE EVALUATION
Patient: Josie Gamez    Procedure: Procedure(s):  Open Biopsy, Curettage, Allograft Packing  Internal Fixation of Right Proximal Humerus       Anesthesia Type:  General    Note:  Disposition: Outpatient   Postop Pain Control: Uneventful            Sign Out: Well controlled pain   PONV: No   Neuro/Psych: Uneventful            Sign Out: Acceptable/Baseline neuro status   Airway/Respiratory: Uneventful            Sign Out: Acceptable/Baseline resp. status   CV/Hemodynamics: Uneventful            Sign Out: Acceptable CV status; No obvious hypovolemia; No obvious fluid overload   Other NRE: NONE   DID A NON-ROUTINE EVENT OCCUR? No           Last vitals:  Vitals Value Taken Time   /73 01/24/23 2000   Temp 36.8  C (98.2  F) 01/24/23 2000   Pulse 112 01/24/23 2004   Resp 18 01/24/23 1930   SpO2 97 % 01/24/23 2004   Vitals shown include unvalidated device data.    Electronically Signed By: Nena Roman MD  January 24, 2023  8:05 PM

## 2023-01-25 NOTE — DISCHARGE INSTRUCTIONS
Same-Day Surgery   Adult Discharge Orders & Instructions     For 24 hours after surgery:  Get plenty of rest.  A responsible adult must stay with you for at least 24 hours after you leave the hospital.   Pain medication can slow your reflexes. Do not drive or use heavy equipment.  If you have weakness or tingling, don't drive or use heavy equipment until this feeling goes away.  Mixing alcohol and pain medication can cause dizziness and slow your breathing. It can even be fatal. Do not drink alcohol while taking pain medication.  Avoid strenuous or risky activities.  Ask for help when climbing stairs.   You may feel lightheaded.  If so, sit for a few minutes before standing.  Have someone help you get up.   If you have nausea (feel sick to your stomach), drink only clear liquids such as apple juice, ginger ale, broth or 7-Up.  Rest may also help.  Be sure to drink enough fluids.  Move to a regular diet as you feel able. Take pain medications with a small amount of solid food, such as toast or crackers, to avoid nausea.   A slight fever is normal. Call the doctor if your fever is over 100 F (37.7 C) (taken under the tongue) or lasts longer than 24 hours.  You may have a dry mouth, muscle aches, trouble sleeping or a sore throat.  These symptoms should go away after 24 hours.  Do not make important or legal decisions.   Pain Management:      1. Take pain medication (if prescribed) for pain as directed by your physician.        2. WARNING: If the pain medication you have been prescribed contains Tylenol  (acetaminophen), DO NOT take additional doses of Tylenol (acetaminophen).     Call your doctor for any of the followin.  Signs of infection (fever, growing tenderness at the surgery site, severe pain, a large amount of drainage or bleeding, foul-smelling drainage, redness, swelling).    2.  It has been over 8 to 10 hours since surgery and you are still not able to urinate (pee).    3.  Headache for over 24  hours.    4.  Numbness, tingling or weakness the day after surgery (if you had spinal anesthesia).  To contact a doctor, call Dr. JAEL Vann, Orthopedic, [phone]  or:  '   [phone] and ask for the Resident On Call for:          Orthopedic (answered 24 hours a day)  '   Emergency Department:  [place] [facility]: [phone]  [place] [facility]: [phone]               Rev. 10/2014      Discharge Instructions: Following Surgery on your Arm    Comfort:  Take the pain medication as ordered.     Care:  Keep the dressing clean, dry and intact.  Watch for drainage  Check color, motion, and sensation of the fingers/hand on a regular basis.     Activity:  Spend a quiet afternoon and evening at home on the day of your surgery.    Follow up:  Call your doctor s office to schedule a follow-up appointment       .[facility]  Rev. 3/2014

## 2023-01-25 NOTE — OP NOTE
DATE OF SURGERY: 1/24/2023    PREOPERATIVE DIAGNOSIS: Bone lesion [M89.9]             POSTOPERATIVE DIAGNOSIS: Same as abovce    PROCEDURES:  1. Right proximal humerus open biopsy  2. Tumor excision via curettage with impaction allograft bone grafting  3. Prophylactic plating right proximal humerus    PRIMARY SURGEON: Iglesia Vann MD    FIRST ASSISTANT: Surjit Molina MD, PGY-4    ANESTHESIA: General Endotracheal    COMPLICATIONS: None apparent immediately postoperatively.    SPECIMENS:  ID Type Source Tests Collected by Time Destination   1 : Right Humerus Bone Lesion Tissue Humerus, Right SURGICAL PATHOLOGY EXAM Iglesia Vann MD 1/24/2023  5:49 PM            DRAINS: None    ESTIMATED BLOOD LOSS: 100 mL    INDICATIONS:                          Josie Gamez is a 42 year old female with 5 months of right shoulder discomfort in absence of precipitating trauma or constitutional symptoms.  Evaluated with radiographs and subsequent MRI demonstrating a lesion of right proximal humerus extending to medial cortex but well demarcated margins.  No soft tissue extension.  May not be responsible for her symptomatology.  Differential diagnosis includes atypical cartilaginous tumor versus other benign bone etiologies.  Likelihood of malignancy is small.    Advised proceeding with open biopsy and frozen section examination.  Depending upon the intraoperative findings, decision will be made as to whether not to proceed with intralesional curettage, allograft bone packing and prophylactic internal fixation.  Explained the risk benefits alternatives to the patient and rationale for this recommendation.  She has an excellent understanding and elected to proceed.    SIGNIFICANT FINDINGS:   Gross appearance of cartilaginous tumor, proceeded with curettage, impaction grafting, prophylactic fixation.    DESCRIPTION OF PROCEDURE:             Josie Gamez was met in the preoperative holding area where the correct surgical  site was identified and marked by the primary surgeon. The patient was then taken to the operating room, where the Anesthesiology Service induced satisfactory general anesthesia.  Ancef was given IV.  Venous thromboembolic prophylaxis was performed with sequential devices.  The patient was placed supineon the operating room table with all bony prominences well padded and a safety strap across the patient's waist. The patient was then prepped and draped in the usual sterile fashion. Prior to proceeding with the operation a timeout was held per hospital policy correctly identifying the patient, operative site, and procedure to be performed. All in the room agreed.    Open biopsy was undertaken by first making incision via deltopectoral approach.  The cephalic veins was mobilized and protected throughout the entirety of the case.  Clavipectoral fascia was opened and subperiosteal dissection along the lateral border of the long head of the biceps tendon was undertken.    The tumor was visualized and upon gross inspection was found to be cartilaginous and consistent with an enchondroma.  Based upon these intraoperative findings of the biopsy I decided to proceed with the tumor excision.    The tumor was now excised in the following manner.  Corticotomy was created and tissue from within the humeral head/metaphysis were obtained.  Gross appearance was consistent with cartilaginous tumor.  These specimens were sent to pathology.  Based on these findings, the decision was made to proceed with entire curetagge.    Complete tumor excision was undertaken with a curette and high speed bur.  Bulb syringe was used to cleanse the bony surfaces.    Once the tumor was debrided in its entirety, a combination of crushed cancellous and cancellous chips were impacted into the humeral defect and the cortical window replaced.  After impaction grafting, preparations for prophylactic fixation were taken.    A 3-hole precontoured Carbofix  plate was felt to fit the patient's anatomy best.  It was prophylactically held in place, just lateral to the long head of the biceps tendon and distal to the greater tuberosity, with 2 K-wires followed by 3 bicortical shaft screws.  Using the aiming tower, 3 unicortial locking screws were placed proximally within the head,  Intraoperative fluoroscopic imaging ensured the glenohumeral joint was not violated.    The wound was copiously irrigated and a layered closure was performed in standard fashion with 2-0 Vicryl figure of 8 sutures re-approximating the deltopectoral fascia, 2-0 Vicryl figure of 8 sutures re-approximating the subcutaneous tissue, and a running 3-0 monocryl for subcuticular closure.  Steri-stips were placed over the incision with overlying silver alginate and tegaderm.    All surgical counts were correct at the end of the case.    Dr. Vann was present for all critical portions of the case.    POSTOPERATIVE PLAN:  Activity: A/PROM RUE as tolerated.  Weight bearing status: Coffee-cup weight bearing RUE until orthopaedic follow-up.  Antibiotics: Ancef perioperatively complete  Diet: Begin with clear fluids and progress diet as tolerated.  DVT prophylaxis: Ambulation  Bracing/Splinting: N/a  Elevation: Elevate RUE as much as possible.  Wound Care: Steri-strips, silver alginate, tegaderm to remain c/d/i  Pain management: OTC pain medication prn. Oxycodone prescription at discharge  X-rays: R shoulder XR in PACU     Follow-up: Clinic with Harjinder Villatoro PA-C on 2/6/2023     Disposition: Discharge from PACU once meets criteria     Surjit Molina MD  Orthopaedic Surgery PGY-4  [phone]     Please page me at [phone] with any questions/concerns. If there is no response, if it is a weekend, or if it is during evening hours then please page the orthopaedic surgery resident on call.         Attending MD (Dr. Iglesia Vann) Attestation:  I was present during the key portions of the procedure and I was  immediately available for the entire procedure between opening and closing.    MD Chevy Velez Family Professor  Oncology and Adult Reconstructive Surgery  Dept Orthopaedic Surgery, [facility]

## 2023-01-27 ENCOUNTER — TELEPHONE (OUTPATIENT)
Dept: ORTHOPEDICS | Facility: CLINIC | Age: 43
End: 2023-01-27
Payer: COMMERCIAL

## 2023-01-27 NOTE — TELEPHONE ENCOUNTER
[facility] Call Center    Phone Message    May a detailed message be left on voicemail: yes     Reason for Call: Other: Pt asking for call back re: post surgery care and has some questions.  Pt had surgery with Dr. Vann on 01/24      Action Taken: Message routed to:  [facility] ([facility]:  Dr. Iglesia Leong    Travel Screening: Not Applicable

## 2023-01-30 LAB
PATH REPORT.COMMENTS IMP SPEC: NORMAL
PATH REPORT.FINAL DX SPEC: NORMAL
PATH REPORT.GROSS SPEC: NORMAL
PATH REPORT.MICROSCOPIC SPEC OTHER STN: NORMAL
PATH REPORT.RELEVANT HX SPEC: NORMAL
PHOTO IMAGE: NORMAL

## 2023-02-03 NOTE — RESULT ENCOUNTER NOTE
Dear Josie Gamez:    Your case was reviewed along with the pathology results at our tumor board this morning.  There are some areas that microscopically are of concern for a little higher risk of the tumor regrowing as compared to a conventional benign cartilage tumor (enchondroma).  Usually the term well-differentiated cartilaginous tumor or low-grade cartilaginous neoplasm is used to label this type of tumor.  Basically it is a borderline situation being benign or malignant tumors.  No further treatment is required at this juncture other than observation and surveillance over time to be sure the mass does not regrow.  I anticipated this and it is for this reason that I placed the carbon fiber plate which facilitates subsequent x-ray evaluations.    Best regards,    Iglesia Vann MD [facility] Family Professor  Oncology and Adult Reconstructive Surgery  Dept Orthopaedic Surgery, [facility]  [phone] office  [email or web address]

## 2023-02-06 ENCOUNTER — ANCILLARY PROCEDURE (OUTPATIENT)
Dept: GENERAL RADIOLOGY | Facility: CLINIC | Age: 43
End: 2023-02-06
Attending: PHYSICIAN ASSISTANT
Payer: COMMERCIAL

## 2023-02-06 ENCOUNTER — OFFICE VISIT (OUTPATIENT)
Dept: ORTHOPEDICS | Facility: CLINIC | Age: 43
End: 2023-02-06
Payer: COMMERCIAL

## 2023-02-06 DIAGNOSIS — Z98.890 STATUS POST SURGERY: ICD-10-CM

## 2023-02-06 DIAGNOSIS — Z98.890 S/P ORIF (OPEN REDUCTION INTERNAL FIXATION) FRACTURE: ICD-10-CM

## 2023-02-06 DIAGNOSIS — Z87.81 S/P ORIF (OPEN REDUCTION INTERNAL FIXATION) FRACTURE: ICD-10-CM

## 2023-02-06 DIAGNOSIS — Z98.890 STATUS POST SURGERY: Primary | ICD-10-CM

## 2023-02-06 PROBLEM — G43.909 MIGRAINE: Status: ACTIVE | Noted: 2023-02-06

## 2023-02-06 PROBLEM — M89.9 BONE LESION: Status: ACTIVE | Noted: 2022-11-25

## 2023-02-06 PROBLEM — D22.9 ATYPICAL NEVUS: Status: ACTIVE | Noted: 2021-12-07

## 2023-02-06 PROBLEM — F33.0 MILD RECURRENT MAJOR DEPRESSION (H): Status: ACTIVE | Noted: 2018-09-28

## 2023-02-06 PROBLEM — F41.1 GENERALIZED ANXIETY DISORDER: Status: ACTIVE | Noted: 2018-09-28

## 2023-02-06 PROCEDURE — 99024 POSTOP FOLLOW-UP VISIT: CPT | Performed by: PHYSICIAN ASSISTANT

## 2023-02-06 PROCEDURE — 73030 X-RAY EXAM OF SHOULDER: CPT | Mod: RT | Performed by: RADIOLOGY

## 2023-02-06 RX ORDER — OXYCODONE HYDROCHLORIDE 5 MG/1
5 TABLET ORAL EVERY 6 HOURS PRN
Qty: 26 TABLET | Refills: 0 | Status: SHIPPED | OUTPATIENT
Start: 2023-02-06 | End: 2023-02-26

## 2023-02-06 ASSESSMENT — PATIENT HEALTH QUESTIONNAIRE - PHQ9
10. IF YOU CHECKED OFF ANY PROBLEMS, HOW DIFFICULT HAVE THESE PROBLEMS MADE IT FOR YOU TO DO YOUR WORK, TAKE CARE OF THINGS AT HOME, OR GET ALONG WITH OTHER PEOPLE: VERY DIFFICULT
SUM OF ALL RESPONSES TO PHQ QUESTIONS 1-9: 13
SUM OF ALL RESPONSES TO PHQ QUESTIONS 1-9: 13

## 2023-02-06 NOTE — LETTER
Date:February 8, 2023      Provider requested that no letter be sent. Do not send.       [facility]

## 2023-02-06 NOTE — LETTER
February 6, 2023    RE:  Josie Gamez                              [address]  [facility] [address]            To whom it may concern:    Josie Gamez is under my professional care for right shoulder surgery. She may return to work with the following:     -No lifting more than 2 lbs pounds with the right arm  -OK to use right arm for light duties  -Should be allowed to wear sling as needed  -Should be allowed to ice as needed    These restrictions until 4/24/23 when she can return to work with no restrictions.      Sincerely,        Harjinder Villatoro PA-C

## 2023-02-06 NOTE — NURSING NOTE
Reason For Visit:   Chief Complaint   Patient presents with     Surgical Followup     2 wk post-op open biopsy, curettage, allograft packing, and internal fixation of right humerus DOS 1/24/23       There were no vitals taken for this visit.    Pain Assessment  Patient Currently in Pain: Yes  0-10 Pain Scale: 3  Primary Pain Location: Shoulder (right)      Tapan Devries ATC

## 2023-02-06 NOTE — PROGRESS NOTES
[facility]  Orthopaedic Surgery  by Harjinder Villatoro PA-C    Josie Gamez MRN# [id number]      YOB: 1980   Background history:  DX:  1. Right humerus bony lesion    TREATMENTS:  1. 1/24/2023, open biopsy, curettage and allograft packing of right proximal humerus bony lesion with internal fixation of right proximal humerus with carbon fiber plate, Dr. Vann, [facility]           Assessment and Plan:   Assessment:  6-year-old female is approximately 2 weeks status post curettage and allograft packing of right proximal humerus bone lesion with internal fixation of right proximal humerus with carbon fiber plate.  Recovering appropriately.     Plan:    Continue to increase range of motion as tolerated.  2 pound lifting restriction in place for 3 months postoperatively.  Okay to use the arm for light activities of daily living.    Physical therapy orders written for range of motion exercises    Wear sling as needed for pain    Refill of oxycodone given.  Continue to ice for swelling    Work note given today.    Incision healing well so she cannot get it wet but should avoid soaking for 2 more weeks.    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  We will continue surveillance to monitor for recurrence every 6 months    Follow-up in 6 months with repeat imaging of the right shoulder     Harjinder Villatoro PA-C  Physician Assistant   Oncology and Adult Reconstructive Surgery  Dept Orthopaedic Surgery, [facility]             History of Present Illness:   42 year old female with approximate 2-week status post the above-mentioned procedure.  Overall she states she is doing well and her pain is well controlled with oxycodone.  She is requesting a refill today.  She has been wearing her sling as needed for pain.  She has been compliant with 2 pound lifting restrictions.  She is working on range of motion exercises.  She has not started physical therapy yet.  She denies any  numbness, weakness, tingling, shortness of breath or chest pain.           Physical Exam:     EXAMINATION pertinent findings:   PSYCH: Pleasant, healthy-appearing, alert, oriented x3, cooperative. Normal mood and affect.  VITAL SIGNS: not currently breastfeeding..  Reviewed nursing intake notes.   There is no height or weight on file to calculate BMI.  RESP: non labored breathing   ABD: benign, soft, non-tender, no acute peritoneal findings  SKIN: grossly normal   LYMPHATIC: grossly normal, no adenopathy, no extremity edema  NEURO: grossly normal , no motor deficits  VASCULAR: satisfactory perfusion of all extremities   MUSCULOSKELETAL:     Right Upper Extremity: Incisions clean dry and intact with no dehiscence, discharge, erythema or ecchymosis.  Motor intact distally with finger flexion/extension/intrinsics/EPL, OK sign 5/5 strength. SILT ax/m/r/u nerve distributions. Radial pulse palpable, 2+.            Data:   All laboratory data reviewed  All imaging studies reviewed by me    2/6/2023 x-ray 2 views right shoulder: Postsurgical changes noted.  Allograft in place with no significant changes from surgery.  Fiber plate noted with no signs of fracture or loosening.  No fractures or dislocations noted.        1/24/2023 surgical pathology:    Final Diagnosis   Right proximal humerus mass, curettage:   - Low-grade cartilaginous neoplasm   - See comment       DATA for DOCUMENTATION:         Past Medical History:   There is no problem list on file for this patient.    No past medical history on file.    Also see scanned health assessment forms.       Past Surgical History:     Past Surgical History:   Procedure Laterality Date     BIOPSY BONE SHOULDER Right 1/24/2023    Procedure: Open Biopsy, Curettage, Allograft Packing;  Surgeon: Iglesia Vann MD;  Location: [facility] OR     OPEN REDUCTION INTERNAL FIXATION HUMERUS PROXIMAL Right 1/24/2023    Procedure: Internal Fixation of Right Proximal Humerus;  Surgeon: Edwar  Iglesia MOSLEY MD;  Location: [facility] OR            Social History:     Social History     Socioeconomic History     Marital status:      Spouse name: Not on file     Number of children: Not on file     Years of education: Not on file     Highest education level: Not on file   Occupational History     Not on file   Tobacco Use     Smoking status: Never     Smokeless tobacco: Never   Substance and Sexual Activity     Alcohol use: Not on file     Drug use: Not on file     Sexual activity: Not on file   Other Topics Concern     Not on file   Social History Narrative     Not on file     Social Determinants of Health     Financial Resource Strain: Not on file   Food Insecurity: Not on file   Transportation Needs: Not on file   Physical Activity: Not on file   Stress: Not on file   Social Connections: Not on file   Intimate Partner Violence: Not on file   Housing Stability: Not on file            Family History:       Family History   Problem Relation Age of Onset     Heart Disease Father      Hypertension Father      Hyperlipidemia Father      Heart Disease Paternal Grandmother             Medications:     Current Outpatient Medications   Medication Sig     acetaminophen (TYLENOL) 325 MG tablet Take 2 tablets (650 mg) by mouth every 4 hours as needed for mild pain     ALPRAZolam (XANAX) 0.25 MG tablet Take 0.25 mg by mouth     Ascorbic Acid 125 MG CHEW Take 2 tablets by mouth     buPROPion (WELLBUTRIN XL) 300 MG 24 hr tablet Dose change: TAKE 1 TABLET BY MOUTH EVERY DAY IN THE MORNING     citalopram (CELEXA) 20 MG tablet Take 1 Tablet (20 mg) by mouth every morning.     oxyCODONE (ROXICODONE) 5 MG tablet Take 1 tablet (5 mg) by mouth every 6 hours as needed for pain     senna-docusate (SENOKOT-S/PERICOLACE) 8.6-50 MG tablet Take 1-2 tablets by mouth 2 times daily     SUMAtriptan (IMITREX STATDOSE) 6 MG/0.5ML refill cartridge 6 mg     SUMAtriptan (IMITREX) 100 MG tablet Take1 tablet by mouth at onset of headache, with repeat  dose in one hour if needed.     topiramate (TOPAMAX) 25 MG tablet Take 50 mg by mouth 2 times daily     triamcinolone (KENALOG) 0.1 % external cream Apply topically to affected area(s) two times daily.     Current Facility-Administered Medications   Medication     ceFAZolin (ANCEF) intermittent infusion 2 g in 50 mL dextrose PREMIX              Review of Systems:   A comprehensive 10 point review of systems (constitutional, ENT, cardiac, peripheral vascular, lymphatic, respiratory, GI, , Musculoskeletal, skin, Neurological) was performed and found to be negative except as described in this note.     Answers for HPI/ROS submitted by the patient on 2/6/2023  If you checked off any problems, how difficult have these problems made it for you to do your work, take care of things at home, or get along with other people?: Very difficult  PHQ9 TOTAL SCORE: 13

## 2023-02-06 NOTE — LETTER
2/6/2023         RE: Josie Gamez  [address]  [facility] [address]        Dear Colleague,    Thank you for referring your patient, Josie Gamez, to the [facility] [place]. Please see a copy of my visit note below.        [facility]  Orthopaedic Surgery  by Harjinder Villatoro PA-C    Josie Gamez MRN# [id number]      YOB: 1980   Background history:  DX:  1. Right humerus bony lesion    TREATMENTS:  1. 1/24/2023, open biopsy, curettage and allograft packing of right proximal humerus bony lesion with internal fixation of right proximal humerus with carbon fiber plate, Dr. Vann, [facility]           Assessment and Plan:   Assessment:  6-year-old female is approximately 2 weeks status post curettage and allograft packing of right proximal humerus bone lesion with internal fixation of right proximal humerus with carbon fiber plate.  Recovering appropriately.     Plan:    Continue to increase range of motion as tolerated.  2 pound lifting restriction in place for 3 months postoperatively.  Okay to use the arm for light activities of daily living.    Physical therapy orders written for range of motion exercises    Wear sling as needed for pain    Refill of oxycodone given.  Continue to ice for swelling    Work note given today.    Incision healing well so she cannot get it wet but should avoid soaking for 2 more weeks.    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  We will continue surveillance to monitor for recurrence every 6 months    Follow-up in 6 months with repeat imaging of the right shoulder     Harjinder Villatoro PA-C  Physician Assistant   Oncology and Adult Reconstructive Surgery  Dept Orthopaedic Surgery, [facility]             History of Present Illness:   42 year old female with approximate 2-week status post the above-mentioned procedure.  Overall she states she is doing well and her pain is well controlled  with oxycodone.  She is requesting a refill today.  She has been wearing her sling as needed for pain.  She has been compliant with 2 pound lifting restrictions.  She is working on range of motion exercises.  She has not started physical therapy yet.  She denies any numbness, weakness, tingling, shortness of breath or chest pain.           Physical Exam:     EXAMINATION pertinent findings:   PSYCH: Pleasant, healthy-appearing, alert, oriented x3, cooperative. Normal mood and affect.  VITAL SIGNS: not currently breastfeeding..  Reviewed nursing intake notes.   There is no height or weight on file to calculate BMI.  RESP: non labored breathing   ABD: benign, soft, non-tender, no acute peritoneal findings  SKIN: grossly normal   LYMPHATIC: grossly normal, no adenopathy, no extremity edema  NEURO: grossly normal , no motor deficits  VASCULAR: satisfactory perfusion of all extremities   MUSCULOSKELETAL:     Right Upper Extremity: Incisions clean dry and intact with no dehiscence, discharge, erythema or ecchymosis.  Motor intact distally with finger flexion/extension/intrinsics/EPL, OK sign 5/5 strength. SILT ax/m/r/u nerve distributions. Radial pulse palpable, 2+.            Data:   All laboratory data reviewed  All imaging studies reviewed by me    2/6/2023 x-ray 2 views right shoulder: Postsurgical changes noted.  Allograft in place with no significant changes from surgery.  Fiber plate noted with no signs of fracture or loosening.  No fractures or dislocations noted.        1/24/2023 surgical pathology:    Final Diagnosis   Right proximal humerus mass, curettage:   - Low-grade cartilaginous neoplasm   - See comment       DATA for DOCUMENTATION:         Past Medical History:   There is no problem list on file for this patient.    No past medical history on file.    Also see scanned health assessment forms.       Past Surgical History:     Past Surgical History:   Procedure Laterality Date     BIOPSY BONE SHOULDER Right  1/24/2023    Procedure: Open Biopsy, Curettage, Allograft Packing;  Surgeon: Iglesia Vann MD;  Location: [facility] OR     OPEN REDUCTION INTERNAL FIXATION HUMERUS PROXIMAL Right 1/24/2023    Procedure: Internal Fixation of Right Proximal Humerus;  Surgeon: Iglesia Vann MD;  Location: [facility] OR            Social History:     Social History     Socioeconomic History     Marital status:      Spouse name: Not on file     Number of children: Not on file     Years of education: Not on file     Highest education level: Not on file   Occupational History     Not on file   Tobacco Use     Smoking status: Never     Smokeless tobacco: Never   Substance and Sexual Activity     Alcohol use: Not on file     Drug use: Not on file     Sexual activity: Not on file   Other Topics Concern     Not on file   Social History Narrative     Not on file     Social Determinants of Health     Financial Resource Strain: Not on file   Food Insecurity: Not on file   Transportation Needs: Not on file   Physical Activity: Not on file   Stress: Not on file   Social Connections: Not on file   Intimate Partner Violence: Not on file   Housing Stability: Not on file            Family History:       Family History   Problem Relation Age of Onset     Heart Disease Father      Hypertension Father      Hyperlipidemia Father      Heart Disease Paternal Grandmother             Medications:     Current Outpatient Medications   Medication Sig     acetaminophen (TYLENOL) 325 MG tablet Take 2 tablets (650 mg) by mouth every 4 hours as needed for mild pain     ALPRAZolam (XANAX) 0.25 MG tablet Take 0.25 mg by mouth     Ascorbic Acid 125 MG CHEW Take 2 tablets by mouth     buPROPion (WELLBUTRIN XL) 300 MG 24 hr tablet Dose change: TAKE 1 TABLET BY MOUTH EVERY DAY IN THE MORNING     citalopram (CELEXA) 20 MG tablet Take 1 Tablet (20 mg) by mouth every morning.     oxyCODONE (ROXICODONE) 5 MG tablet Take 1 tablet (5 mg) by mouth every 6 hours as needed for pain      senna-docusate (SENOKOT-S/PERICOLACE) 8.6-50 MG tablet Take 1-2 tablets by mouth 2 times daily     SUMAtriptan (IMITREX STATDOSE) 6 MG/0.5ML refill cartridge 6 mg     SUMAtriptan (IMITREX) 100 MG tablet Take1 tablet by mouth at onset of headache, with repeat dose in one hour if needed.     topiramate (TOPAMAX) 25 MG tablet Take 50 mg by mouth 2 times daily     triamcinolone (KENALOG) 0.1 % external cream Apply topically to affected area(s) two times daily.     Current Facility-Administered Medications   Medication     ceFAZolin (ANCEF) intermittent infusion 2 g in 50 mL dextrose PREMIX              Review of Systems:   A comprehensive 10 point review of systems (constitutional, ENT, cardiac, peripheral vascular, lymphatic, respiratory, GI, , Musculoskeletal, skin, Neurological) was performed and found to be negative except as described in this note.     Answers for HPI/ROS submitted by the patient on 2/6/2023  If you checked off any problems, how difficult have these problems made it for you to do your work, take care of things at home, or get along with other people?: Very difficult  PHQ9 TOTAL SCORE: 13          Again, thank you for allowing me to participate in the care of your patient.        Sincerely,        Harjinder Villatoro PA-C

## 2023-02-10 ENCOUNTER — THERAPY VISIT (OUTPATIENT)
Dept: PHYSICAL THERAPY | Facility: CLINIC | Age: 43
End: 2023-02-10
Attending: PHYSICIAN ASSISTANT
Payer: COMMERCIAL

## 2023-02-10 DIAGNOSIS — M25.511 ACUTE PAIN OF RIGHT SHOULDER: ICD-10-CM

## 2023-02-10 DIAGNOSIS — Z98.890 S/P ORIF (OPEN REDUCTION INTERNAL FIXATION) FRACTURE: ICD-10-CM

## 2023-02-10 DIAGNOSIS — Z87.81 S/P ORIF (OPEN REDUCTION INTERNAL FIXATION) FRACTURE: ICD-10-CM

## 2023-02-10 PROCEDURE — 97110 THERAPEUTIC EXERCISES: CPT | Mod: GP | Performed by: PHYSICAL THERAPIST

## 2023-02-10 PROCEDURE — 97161 PT EVAL LOW COMPLEX 20 MIN: CPT | Mod: GP | Performed by: PHYSICAL THERAPIST

## 2023-02-10 NOTE — PROGRESS NOTES
Physical Therapy Initial Evaluation  Subjective:    Patient Health History  Josie Gamez being seen for PT after surgery on my right humerous to regain shoulder range of motion.     Problem began: 1/24/2023.   Problem occurred: Surgery to remove a tumor   Pain is reported as 6/10 on pain scale.  General health as reported by patient is good.  Pertinent medical history includes: depression, mental illness, migraines/headaches, numbness/tingling and weakness.     Medical allergies: other. Other medical allergies details: Antibiotics.   Surgeries include:  Orthopedic surgery.    Current medications:  Anti-depressants, anti-seizure medication, pain medication and other. Other medications details: Anti-anxiety.    Current occupation is Teacher.   Primary job tasks include:  Computer work, driving, prolonged sitting, prolonged standing and other.   Other job/home tasks details: Handwriting, childcare.                 Josie Gamez is a 42 year old female with a right shoulder condition.    The condition occurred due to surgery for tumor.  The condition occurred with insidious onset.  This is a new condition.    Mechanism/History of injury/symptoms:  1/24/23 patient underwent right shoulder surgery to remove a tumor from her proximal humerus and ORIF with carbon fiber plate.   The pain is located right upper arm, shoulder and the quality of pain is reported as aching.  The degree of pain is reported as 6/10. The timing of pain/symptoms is constant, worse at the end of the day. Associated symptoms include: loss of motion, stiffness, weakness    Symptoms are exacerbated by: reaching overhead/out to side/behind back, lifting with right arm, lying on right side, pushing/pulling.  Symptoms are relieved by: ibuprofen, rest.  Since onset symptoms are gradually improving.    Special tests for this condition include: x-ray, MRI.  Previous treatments for this condition include: surgery.    Potential barriers to physical  therapy: none.  Red flags: none.    Previous level of function: unrestricted use of right shoulder for reaching, lifting, pushing/pulling  Current functional restrictions:  Unable to use right shoulder for ADL's above shoulder or behind back, unable to lift or push/pull with right arm, unable to sleep on right arm                   Objective:  SHOULDER:   PROM L PROM R AROM L AROM R MMT L MMT R   Flex  80 with pain 175 80 with pain 5/5 nt   Abd  60 with pain 175 60 with pain 5/5 nt   Full Can     5/5 nt   IR  50 with pain   5/5 nt   ER  10 withpain 90 10 with pain 5/5 nt   Ext/IR   T6 L5 with pain       Scapulothoraic Rhythm: delayed periscapular recruitment on right for scapular retraction/depression    Palpation: right shoulder tender about surgical incision        System    Physical Exam    General     ROS    Assessment/Plan:    Patient is a 42 year old female with right side shoulder complaints.    Patient has the following significant findings with corresponding treatment plan.                Diagnosis 1:  S/p right shoulder ORIF/tumor removal    Pain -  hot/cold therapy, manual therapy, self management, education and home program  Decreased ROM/flexibility - manual therapy, therapeutic exercise and home program  Decreased strength - therapeutic exercise, therapeutic activities and home program  Decreased proprioception - neuro re-education, therapeutic activities and home program  Decreased function - therapeutic activities and home program    Low complexity.    Previous and current functional limitations:  (See Goal Flow Sheet for this information)    Short term and Long term goals: (See Goal Flow Sheet for this information)     Communication ability:  Patient appears to be able to clearly communicate and understand verbal and written communication and follow directions correctly.  Treatment Explanation - The following has been discussed with the patient:   RX ordered/plan of care  Anticipated  outcomes  Possible risks and side effects  This patient would benefit from PT intervention to resume normal activities.   Rehab potential is good.    Frequency:  1 X week, once daily  Duration:  for 12 weeks  Discharge Plan:  Achieve all LTG.  Independent in home treatment program.  Reach maximal therapeutic benefit.    Please refer to the daily flowsheet for treatment today, total treatment time and time spent performing 1:1 timed codes.

## 2023-02-22 ENCOUNTER — THERAPY VISIT (OUTPATIENT)
Dept: PHYSICAL THERAPY | Facility: CLINIC | Age: 43
End: 2023-02-22
Payer: COMMERCIAL

## 2023-02-22 DIAGNOSIS — Z98.890 S/P ORIF (OPEN REDUCTION INTERNAL FIXATION) FRACTURE: ICD-10-CM

## 2023-02-22 DIAGNOSIS — M25.511 ACUTE PAIN OF RIGHT SHOULDER: Primary | ICD-10-CM

## 2023-02-22 DIAGNOSIS — Z87.81 S/P ORIF (OPEN REDUCTION INTERNAL FIXATION) FRACTURE: ICD-10-CM

## 2023-02-22 PROCEDURE — 97010 HOT OR COLD PACKS THERAPY: CPT | Mod: GP | Performed by: PHYSICAL THERAPIST

## 2023-02-22 PROCEDURE — 97110 THERAPEUTIC EXERCISES: CPT | Mod: GP | Performed by: PHYSICAL THERAPIST

## 2023-03-01 ENCOUNTER — THERAPY VISIT (OUTPATIENT)
Dept: PHYSICAL THERAPY | Facility: CLINIC | Age: 43
End: 2023-03-01
Payer: COMMERCIAL

## 2023-03-01 DIAGNOSIS — Z87.81 S/P ORIF (OPEN REDUCTION INTERNAL FIXATION) FRACTURE: ICD-10-CM

## 2023-03-01 DIAGNOSIS — M25.511 ACUTE PAIN OF RIGHT SHOULDER: Primary | ICD-10-CM

## 2023-03-01 DIAGNOSIS — Z98.890 S/P ORIF (OPEN REDUCTION INTERNAL FIXATION) FRACTURE: ICD-10-CM

## 2023-03-01 PROCEDURE — 97110 THERAPEUTIC EXERCISES: CPT | Mod: GP | Performed by: PHYSICAL THERAPIST

## 2023-03-01 PROCEDURE — 97010 HOT OR COLD PACKS THERAPY: CPT | Mod: GP | Performed by: PHYSICAL THERAPIST

## 2023-03-09 ENCOUNTER — THERAPY VISIT (OUTPATIENT)
Dept: PHYSICAL THERAPY | Facility: CLINIC | Age: 43
End: 2023-03-09
Payer: COMMERCIAL

## 2023-03-09 DIAGNOSIS — Z98.890 S/P ORIF (OPEN REDUCTION INTERNAL FIXATION) FRACTURE: ICD-10-CM

## 2023-03-09 DIAGNOSIS — M25.511 ACUTE PAIN OF RIGHT SHOULDER: Primary | ICD-10-CM

## 2023-03-09 DIAGNOSIS — Z87.81 S/P ORIF (OPEN REDUCTION INTERNAL FIXATION) FRACTURE: ICD-10-CM

## 2023-03-09 PROCEDURE — 97110 THERAPEUTIC EXERCISES: CPT | Mod: GP | Performed by: PHYSICAL THERAPIST

## 2023-03-09 PROCEDURE — 97010 HOT OR COLD PACKS THERAPY: CPT | Mod: GP | Performed by: PHYSICAL THERAPIST

## 2023-03-09 PROCEDURE — 97112 NEUROMUSCULAR REEDUCATION: CPT | Mod: GP | Performed by: PHYSICAL THERAPIST

## 2023-03-14 ENCOUNTER — THERAPY VISIT (OUTPATIENT)
Dept: PHYSICAL THERAPY | Facility: CLINIC | Age: 43
End: 2023-03-14
Payer: COMMERCIAL

## 2023-03-14 DIAGNOSIS — M25.511 ACUTE PAIN OF RIGHT SHOULDER: Primary | ICD-10-CM

## 2023-03-14 DIAGNOSIS — Z87.81 S/P ORIF (OPEN REDUCTION INTERNAL FIXATION) FRACTURE: ICD-10-CM

## 2023-03-14 DIAGNOSIS — Z98.890 S/P ORIF (OPEN REDUCTION INTERNAL FIXATION) FRACTURE: ICD-10-CM

## 2023-03-14 PROCEDURE — 97010 HOT OR COLD PACKS THERAPY: CPT | Mod: GP | Performed by: PHYSICAL THERAPIST

## 2023-03-14 PROCEDURE — 97112 NEUROMUSCULAR REEDUCATION: CPT | Mod: GP | Performed by: PHYSICAL THERAPIST

## 2023-03-14 PROCEDURE — 97110 THERAPEUTIC EXERCISES: CPT | Mod: GP | Performed by: PHYSICAL THERAPIST

## 2023-03-21 ENCOUNTER — TELEPHONE (OUTPATIENT)
Dept: ORTHOPEDICS | Facility: CLINIC | Age: 43
End: 2023-03-21
Payer: COMMERCIAL

## 2023-03-23 ENCOUNTER — THERAPY VISIT (OUTPATIENT)
Dept: PHYSICAL THERAPY | Facility: CLINIC | Age: 43
End: 2023-03-23
Payer: COMMERCIAL

## 2023-03-23 DIAGNOSIS — Z87.81 S/P ORIF (OPEN REDUCTION INTERNAL FIXATION) FRACTURE: ICD-10-CM

## 2023-03-23 DIAGNOSIS — M25.511 ACUTE PAIN OF RIGHT SHOULDER: Primary | ICD-10-CM

## 2023-03-23 DIAGNOSIS — Z98.890 S/P ORIF (OPEN REDUCTION INTERNAL FIXATION) FRACTURE: ICD-10-CM

## 2023-03-23 PROCEDURE — 97110 THERAPEUTIC EXERCISES: CPT | Mod: GP | Performed by: PHYSICAL THERAPIST

## 2023-03-23 PROCEDURE — 97010 HOT OR COLD PACKS THERAPY: CPT | Mod: GP | Performed by: PHYSICAL THERAPIST

## 2023-03-23 PROCEDURE — 97140 MANUAL THERAPY 1/> REGIONS: CPT | Mod: GP | Performed by: PHYSICAL THERAPIST

## 2023-03-30 ENCOUNTER — THERAPY VISIT (OUTPATIENT)
Dept: PHYSICAL THERAPY | Facility: CLINIC | Age: 43
End: 2023-03-30
Payer: COMMERCIAL

## 2023-03-30 DIAGNOSIS — Z98.890 S/P ORIF (OPEN REDUCTION INTERNAL FIXATION) FRACTURE: ICD-10-CM

## 2023-03-30 DIAGNOSIS — Z87.81 S/P ORIF (OPEN REDUCTION INTERNAL FIXATION) FRACTURE: ICD-10-CM

## 2023-03-30 DIAGNOSIS — M25.511 ACUTE PAIN OF RIGHT SHOULDER: Primary | ICD-10-CM

## 2023-03-30 PROCEDURE — 97010 HOT OR COLD PACKS THERAPY: CPT | Mod: GP | Performed by: PHYSICAL THERAPIST

## 2023-03-30 PROCEDURE — 97110 THERAPEUTIC EXERCISES: CPT | Mod: GP | Performed by: PHYSICAL THERAPIST

## 2023-03-30 PROCEDURE — 97112 NEUROMUSCULAR REEDUCATION: CPT | Mod: GP | Performed by: PHYSICAL THERAPIST

## 2023-04-11 ENCOUNTER — THERAPY VISIT (OUTPATIENT)
Dept: PHYSICAL THERAPY | Facility: CLINIC | Age: 43
End: 2023-04-11
Payer: COMMERCIAL

## 2023-04-11 DIAGNOSIS — Z87.81 S/P ORIF (OPEN REDUCTION INTERNAL FIXATION) FRACTURE: ICD-10-CM

## 2023-04-11 DIAGNOSIS — Z98.890 S/P ORIF (OPEN REDUCTION INTERNAL FIXATION) FRACTURE: ICD-10-CM

## 2023-04-11 DIAGNOSIS — M25.511 ACUTE PAIN OF RIGHT SHOULDER: Primary | ICD-10-CM

## 2023-04-11 PROCEDURE — 97010 HOT OR COLD PACKS THERAPY: CPT | Mod: GP | Performed by: PHYSICAL THERAPIST

## 2023-04-11 PROCEDURE — 97112 NEUROMUSCULAR REEDUCATION: CPT | Mod: GP | Performed by: PHYSICAL THERAPIST

## 2023-04-11 PROCEDURE — 97110 THERAPEUTIC EXERCISES: CPT | Mod: GP | Performed by: PHYSICAL THERAPIST

## 2023-04-25 ENCOUNTER — THERAPY VISIT (OUTPATIENT)
Dept: PHYSICAL THERAPY | Facility: CLINIC | Age: 43
End: 2023-04-25
Payer: COMMERCIAL

## 2023-04-25 DIAGNOSIS — Z98.890 S/P ORIF (OPEN REDUCTION INTERNAL FIXATION) FRACTURE: ICD-10-CM

## 2023-04-25 DIAGNOSIS — Z87.81 S/P ORIF (OPEN REDUCTION INTERNAL FIXATION) FRACTURE: ICD-10-CM

## 2023-04-25 DIAGNOSIS — M25.511 ACUTE PAIN OF RIGHT SHOULDER: Primary | ICD-10-CM

## 2023-04-25 PROCEDURE — 97110 THERAPEUTIC EXERCISES: CPT | Mod: GP | Performed by: PHYSICAL THERAPIST

## 2023-04-25 PROCEDURE — 97112 NEUROMUSCULAR REEDUCATION: CPT | Mod: GP | Performed by: PHYSICAL THERAPIST

## 2023-04-25 PROCEDURE — 97010 HOT OR COLD PACKS THERAPY: CPT | Mod: GP | Performed by: PHYSICAL THERAPIST

## 2023-05-08 ENCOUNTER — THERAPY VISIT (OUTPATIENT)
Dept: PHYSICAL THERAPY | Facility: CLINIC | Age: 43
End: 2023-05-08
Payer: COMMERCIAL

## 2023-05-08 DIAGNOSIS — Z98.890 S/P ORIF (OPEN REDUCTION INTERNAL FIXATION) FRACTURE: ICD-10-CM

## 2023-05-08 DIAGNOSIS — M25.511 ACUTE PAIN OF RIGHT SHOULDER: Primary | ICD-10-CM

## 2023-05-08 DIAGNOSIS — Z87.81 S/P ORIF (OPEN REDUCTION INTERNAL FIXATION) FRACTURE: ICD-10-CM

## 2023-05-08 PROCEDURE — 97110 THERAPEUTIC EXERCISES: CPT | Mod: GP | Performed by: PHYSICAL THERAPIST

## 2023-05-08 PROCEDURE — 97112 NEUROMUSCULAR REEDUCATION: CPT | Mod: GP | Performed by: PHYSICAL THERAPIST

## 2023-05-08 PROCEDURE — 97010 HOT OR COLD PACKS THERAPY: CPT | Mod: GP | Performed by: PHYSICAL THERAPIST

## 2023-05-08 NOTE — PROGRESS NOTES
Subjective:  HPI  Physical Exam                    Objective:  System    Physical Exam    General     ROS    Assessment/Plan:    PROGRESS  REPORT    Progress reporting period is from 2/10/23 to 5/8/23.       SUBJECTIVE  Subjective changes noted by patient: Elisa reports her shoulder is doing well overall. It gets sore off and on and lately has been down her biceps a little bit.  All things considered she is pleased with the progress she has made but knows she needs to keep getting stronger and better muscle control.    Current pain level is 1/10.     Previous pain level was  6/10.   Changes in function:  Yes, see above.  Adverse reaction to treatment or activity: None    OBJECTIVE  Changes noted in objective findings:  Yes  Right shoulder AROM:     flexion 175    abduction limited to 110     scaption 175     ER 85     IR/EXT to T6    Right shoulder strength:    flexion 5-/5    scaption 5-/5    abduction 5-/5    ER 5-/5    IR 5-/5     ASSESSMENT/PLAN  Updated problem list and treatment plan: Diagnosis 1:  S/p right shoulder ORIF/tumor removal    Pain -  hot/cold therapy, manual therapy, self management, education and home program  Decreased ROM/flexibility - manual therapy, therapeutic exercise and home program  Decreased strength - therapeutic exercise, therapeutic activities and home program  Decreased proprioception - neuro re-education, therapeutic activities and home program  Decreased function - therapeutic activities and home program  STG/LTGs have been met or progress has been made towards goals:  Yes (See Goal flow sheet completed today.)  Assessment of Progress: The patient's condition is improving.  The patient's condition has potential to improve.  Patient is meeting short term goals and is progressing towards long term goals.  Self Management Plans:  Patient has been instructed in a home treatment program.  Patient  has been instructed in self management of symptoms.    Josie continues to require the  following intervention to meet STG and LTG's:  PT    Recommendations:  This patient would benefit from continued therapy.     Frequency:  2 X a month, once daily  Duration:  for 2 months        Please refer to the daily flowsheet for treatment today, total treatment time and time spent performing 1:1 timed codes.

## 2023-05-23 ENCOUNTER — THERAPY VISIT (OUTPATIENT)
Dept: PHYSICAL THERAPY | Facility: CLINIC | Age: 43
End: 2023-05-23
Payer: COMMERCIAL

## 2023-05-23 DIAGNOSIS — Z98.890 S/P ORIF (OPEN REDUCTION INTERNAL FIXATION) FRACTURE: ICD-10-CM

## 2023-05-23 DIAGNOSIS — Z87.81 S/P ORIF (OPEN REDUCTION INTERNAL FIXATION) FRACTURE: ICD-10-CM

## 2023-05-23 DIAGNOSIS — M25.511 ACUTE PAIN OF RIGHT SHOULDER: Primary | ICD-10-CM

## 2023-05-23 PROCEDURE — 97140 MANUAL THERAPY 1/> REGIONS: CPT | Mod: GP | Performed by: PHYSICAL THERAPIST

## 2023-05-23 PROCEDURE — 97010 HOT OR COLD PACKS THERAPY: CPT | Mod: GP | Performed by: PHYSICAL THERAPIST

## 2023-05-23 PROCEDURE — 97110 THERAPEUTIC EXERCISES: CPT | Mod: GP | Performed by: PHYSICAL THERAPIST

## 2023-06-20 DIAGNOSIS — M89.9 BONE LESION: Primary | ICD-10-CM

## 2023-06-22 ENCOUNTER — THERAPY VISIT (OUTPATIENT)
Dept: PHYSICAL THERAPY | Facility: CLINIC | Age: 43
End: 2023-06-22
Payer: COMMERCIAL

## 2023-06-22 DIAGNOSIS — M25.511 ACUTE PAIN OF RIGHT SHOULDER: Primary | ICD-10-CM

## 2023-06-22 DIAGNOSIS — Z98.890 S/P ORIF (OPEN REDUCTION INTERNAL FIXATION) FRACTURE: ICD-10-CM

## 2023-06-22 DIAGNOSIS — Z87.81 S/P ORIF (OPEN REDUCTION INTERNAL FIXATION) FRACTURE: ICD-10-CM

## 2023-06-22 PROCEDURE — 97110 THERAPEUTIC EXERCISES: CPT | Mod: GP | Performed by: PHYSICAL THERAPIST

## 2023-06-22 NOTE — PROGRESS NOTES
PLAN     06/22/23 0500   Appointment Info   Signing clinician's name / credentials Kain Asuma, DPT, SCS   Total/Authorized Visits 12 per eval and treat   Visits Used 12   Medical Diagnosis s/p R shoulder ORIF/tumor removal   PT Tx Diagnosis s/p R shoulder ORIF/tumor removal   Progress Note/Certification   Onset of illness/injury or Date of Surgery 04/11/23   Therapy Frequency 2x/month   Predicted Duration 2 months   Progress Note Completed Date 06/22/23   GOALS   PT Goals 2   PT Goal 1   Goal Identifier Reaching   Goal Description Patient will be able to reach through full AROM in all planes with 0-1/10 pain.   Rationale to maximize safety and independence with performance of ADLs and functional tasks;to maximize safety and independence within the home;to maximize safety and independence within the community;to maximize safety and independence with self cares   Goal Progress Full overhead flexion, full overhead abduction attained after stretching, end range pain with full ER, full IR/EXT   Target Date 07/18/23   PT Goal 2   Goal Identifier Lifting   Goal Description Patient will be able to lift 10 lb to overhead shelf with 0-1/10 pain   Rationale to maximize safety and independence with performance of ADLs and functional tasks;to maximize safety and independence within the home;to maximize safety and independence within the community;to maximize safety and independence with self cares   Goal Progress Lifting 5-8 lb overhead without pain   Target Date 07/18/23   Subjective Report   Subjective Report Elisa reports her shoulder is doing pretty well overall.  She still notes pain in the back of the arm when she tries to reach behind her into the back seat or to the seatbelt if she is in the passenger seat.  She notes fatigue and stiffness when doing downward dog during yoga.  Otherwise, she feels like her shoulder is functioning pretty well with most daily tasks. She is not yet lifting heavy weights but feels  "comfortable with most items she lifts on a daily basis.   Objective Measures   Objective Measures Objective Measure 1;Objective Measure 2;Objective Measure 3   Objective Measure 1   Objective Measure ROM   Details Right shoulder AROM:  flexion 180; abduction 130 initially, 175 after pec stretching; ER 90 with end range pain posteriorly;  IR/EXT to T6 without pain   Objective Measure 2   Objective Measure Strength   Details Right shoulder strength:  flexion 5/5; scaption 5/5; abduction 5/5;  ER 5-/5  IR 5/5   Objective Measure 3   Objective Measure Scapular mechanics   Details Symmetrical resting scapular position with hands at side and on hips.  Symmetrical scapular rhythm with overhead flexion but mild assymetries noted with active abduction.   Treatment Interventions (PT)   Interventions Therapeutic Procedure/Exercise   Therapeutic Procedure/Exercise   Therapeutic Procedures: strength, endurance, ROM, flexibillity minutes (13364) 40   Therapeutic Procedures Ther Proc 7   Ther Proc 1 UBE   Ther Proc 1 - Details 5 min   Ther Proc 2 Prone I to T   Ther Proc 2 - Details x10   Ther Proc 3 prone W to Y   Ther Proc 3 - Details x10   Ther Proc 4 mid range SL shoulder ER   Ther Proc 4 - Details 1 lb x1 set to fatigue   Ther Proc 5 ER step outs   Ther Proc 5 - Details red band x1 set to fatigue   Ther Proc 6 manual pec stretch in supine   Ther Proc 6 - Details 15\" hold x5   Ther Proc 7 doorway pec stretch with pec minor focus   Ther Proc 7 - Details 15\" hold x5   Plan   Updates to plan of care continue HEP until sees surgeon, then determine whether or not continued PT is needed vs HEP   Total Session Time   Timed Code Treatment Minutes 40   Total Treatment Time (sum of timed and untimed services) 40         Beginning/End Dates of Progress Note Reporting Period:  06/22/23 to 06/22/2023    Referring Provider:  Harjinder Villatoro    "

## 2023-07-13 ENCOUNTER — ANCILLARY PROCEDURE (OUTPATIENT)
Dept: GENERAL RADIOLOGY | Facility: CLINIC | Age: 43
End: 2023-07-13
Attending: ORTHOPAEDIC SURGERY
Payer: COMMERCIAL

## 2023-07-13 ENCOUNTER — OFFICE VISIT (OUTPATIENT)
Dept: ORTHOPEDICS | Facility: CLINIC | Age: 43
End: 2023-07-13
Payer: COMMERCIAL

## 2023-07-13 DIAGNOSIS — Z87.81 S/P ORIF (OPEN REDUCTION INTERNAL FIXATION) FRACTURE: Primary | ICD-10-CM

## 2023-07-13 DIAGNOSIS — M89.9 BONE LESION: ICD-10-CM

## 2023-07-13 DIAGNOSIS — Z98.890 S/P ORIF (OPEN REDUCTION INTERNAL FIXATION) FRACTURE: Primary | ICD-10-CM

## 2023-07-13 PROCEDURE — 99213 OFFICE O/P EST LOW 20 MIN: CPT | Mod: GC | Performed by: ORTHOPAEDIC SURGERY

## 2023-07-13 PROCEDURE — 73030 X-RAY EXAM OF SHOULDER: CPT | Mod: RT | Performed by: RADIOLOGY

## 2023-07-13 NOTE — PROGRESS NOTES
[facility]  Orthopaedic Surgery  by Harjinder Villatoro PA-C    Josie Gamez MRN# [id number]      YOB: 1980   Background history:  DX:  1. 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 1/24/2023, open biopsy, curettage and allograft packing of right proximal humerus bony lesion with internal fixation of right proximal humerus with carbon fiber plate, Dr. Vann, [facility]         Assessment and Plan:   Assessment:  42-year-old female is approximately 6 months status post curettage and allograft packing of right proximal humerus bone lesion with internal fixation of right proximal humerus with carbon fiber plate.  Recovering appropriately. No evidence of recurrence on imaging. She can consider herself cured.     Plan:    Continue activity as tolerated - no restrictions    Continue PT, as desired    Follow-up in 6 months with repeat imaging of the right shoulder. Patient desires in-person appointment.    Calista Meredith MD, PGY-4  Orthopedics    Patient was seen and discussed with Dr. Vann.           History of Present Illness:   42 year old female who is approximately 6 months status post the above-mentioned procedure.  Overall she states she is doing well and her pain is well controlled.  She is working on ROM and strengthening with PT.  Motion going well besides some impingement wit abduction.         Physical Exam:     EXAMINATION pertinent findings:   PSYCH: Pleasant, healthy-appearing, alert, oriented x3, cooperative. Normal mood and affect.  VITAL SIGNS: not currently breastfeeding..  Reviewed nursing intake notes.   There is no height or weight on file to calculate BMI.  RESP: non labored breathing   ABD: benign, soft, non-tender, no acute peritoneal findings  SKIN: grossly normal   LYMPHATIC: grossly normal, no adenopathy, no extremity edema  NEURO: grossly normal , no motor deficits  VASCULAR: satisfactory perfusion of all extremities   MUSCULOSKELETAL:       Right Upper Extremity: Incision well healed.  Motor intact distally with finger flexion/extension/intrinsics/EPL, OK sign 5/5 strength. SILT ax/m/r/u nerve distributions. Minimal numbness about incision. Radial pulse palpable, 2+.   ROM: forward flexion 180, abduction 100, IR thoracic spine, ER 60 deg (contralateral 80 deg)  No TTP about acromion or lateral shoulder         Data:   All laboratory data reviewed  All imaging studies reviewed by me        1/24/2023 surgical pathology:    Final Diagnosis   Right proximal humerus mass, curettage:   - Low-grade cartilaginous neoplasm   - See comment

## 2023-07-13 NOTE — LETTER
7/13/2023         RE: Josie Gamez  [address]  [facility] [address]        Dear Colleague,    Thank you for referring your patient, Josie Gamez, to the [facility] [place]. Please see a copy of my visit note below.        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 Vann, [facility]         Assessment and Plan:   Assessment:  42-year-old female is approximately 6 months status post curettage and allograft packing of right proximal humerus bone lesion with internal fixation of right proximal humerus with carbon fiber plate.  Recovering appropriately. No evidence of recurrence on imaging. She can consider herself cured.     Plan:  Continue activity as tolerated - no restrictions  Continue PT, as desired  Follow-up in 6 months with repeat imaging of the right shoulder. Patient desires in-person appointment.    Calista Meredith MD, PGY-4  Orthopedics    Patient was seen and discussed with Dr. Vann.           History of Present Illness:   42 year old female who is approximately 6 months status post the above-mentioned procedure.  Overall she states she is doing well and her pain is well controlled.  She is working on ROM and strengthening with PT.  Motion going well besides some impingement wit abduction.         Physical Exam:     EXAMINATION pertinent findings:   PSYCH: Pleasant, healthy-appearing, alert, oriented x3, cooperative. Normal mood and affect.  VITAL SIGNS: not currently breastfeeding..  Reviewed nursing intake notes.   There is no height or weight on file to calculate BMI.  RESP: non labored breathing   ABD:  benign, soft, non-tender, no acute peritoneal findings  SKIN: grossly normal   LYMPHATIC: grossly normal, no adenopathy, no extremity edema  NEURO: grossly normal , no motor deficits  VASCULAR: satisfactory perfusion of all extremities   MUSCULOSKELETAL:      Right Upper Extremity: Incision well healed.  Motor intact distally with finger flexion/extension/intrinsics/EPL, OK sign 5/5 strength. SILT ax/m/r/u nerve distributions. Minimal numbness about incision. Radial pulse palpable, 2+.   ROM: forward flexion 180, abduction 100, IR thoracic spine, ER 60 deg (contralateral 80 deg)  No TTP about acromion or lateral shoulder         Data:   All laboratory data reviewed  All imaging studies reviewed by me        1/24/2023 surgical pathology:    Final Diagnosis   Right proximal humerus mass, curettage:   - Low-grade cartilaginous neoplasm   - See comment             Iglesia Vann MD

## 2023-12-16 ENCOUNTER — HEALTH MAINTENANCE LETTER (OUTPATIENT)
Age: 43
End: 2023-12-16

## 2024-02-15 DIAGNOSIS — Z98.890 S/P ORIF (OPEN REDUCTION INTERNAL FIXATION) FRACTURE: Primary | ICD-10-CM

## 2024-02-15 DIAGNOSIS — Z87.81 S/P ORIF (OPEN REDUCTION INTERNAL FIXATION) FRACTURE: Primary | ICD-10-CM

## 2024-02-19 ENCOUNTER — OFFICE VISIT (OUTPATIENT)
Dept: ORTHOPEDICS | Facility: CLINIC | Age: 44
End: 2024-02-19
Payer: COMMERCIAL

## 2024-02-19 ENCOUNTER — ANCILLARY PROCEDURE (OUTPATIENT)
Dept: GENERAL RADIOLOGY | Facility: CLINIC | Age: 44
End: 2024-02-19
Attending: ORTHOPAEDIC SURGERY
Payer: COMMERCIAL

## 2024-02-19 DIAGNOSIS — Z98.890 S/P ORIF (OPEN REDUCTION INTERNAL FIXATION) FRACTURE: ICD-10-CM

## 2024-02-19 DIAGNOSIS — Z87.81 S/P ORIF (OPEN REDUCTION INTERNAL FIXATION) FRACTURE: ICD-10-CM

## 2024-02-19 DIAGNOSIS — Z87.81 S/P ORIF (OPEN REDUCTION INTERNAL FIXATION) FRACTURE: Primary | ICD-10-CM

## 2024-02-19 DIAGNOSIS — D49.2 NEOPLASM OF CARTILAGE: ICD-10-CM

## 2024-02-19 DIAGNOSIS — Z98.890 S/P ORIF (OPEN REDUCTION INTERNAL FIXATION) FRACTURE: Primary | ICD-10-CM

## 2024-02-19 PROCEDURE — 99213 OFFICE O/P EST LOW 20 MIN: CPT | Performed by: ORTHOPAEDIC SURGERY

## 2024-02-19 PROCEDURE — 73030 X-RAY EXAM OF SHOULDER: CPT | Mod: RT | Performed by: RADIOLOGY

## 2024-02-19 RX ORDER — VENLAFAXINE HYDROCHLORIDE 150 MG/1
150 CAPSULE, EXTENDED RELEASE ORAL
COMMUNITY
Start: 2024-02-15

## 2024-02-19 RX ORDER — LISDEXAMFETAMINE DIMESYLATE 40 MG/1
1 CAPSULE ORAL EVERY MORNING
COMMUNITY
Start: 2024-02-15

## 2024-02-19 RX ORDER — RIMEGEPANT SULFATE 75 MG/75MG
TABLET, ORALLY DISINTEGRATING ORAL
COMMUNITY
Start: 2023-10-03

## 2024-02-19 RX ORDER — METHOCARBAMOL 750 MG/1
750 TABLET, FILM COATED ORAL
COMMUNITY
Start: 2023-07-17

## 2024-02-19 RX ORDER — PROCHLORPERAZINE MALEATE 10 MG
TABLET ORAL
COMMUNITY
Start: 2023-10-03

## 2024-02-19 NOTE — LETTER
2/19/2024         RE: Josie Gamez  [address]  [place] [facility] [address]        Dear Colleague,    Thank you for referring your patient, Josie Gamez, to the [facility] [place]. Please see a copy of my visit note below.        [facility], Orthopaedic Oncology Surgery Consultation  by Iglesia Vann M.D.    Josie Gamez MRN# [id number]    YOB: 1980     Requesting physician: No ref. 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 Vann, [facility]      Elisa is seen for recheck 1 year after her biopsy and curettage and allograft packing procedure for the right proximal humerus low-grade cartilaginous tumor.    She reports no symptoms of discomfort other than occasional sensation when she abducts her arm.  Otherwise she is able perform all regular activities.  She has been healthy otherwise.    Examination demonstrates forward flexion 180 degrees, abduction to 180 but some discomfort with 90 degrees of abduction.  Internal rotation to L5 and external rotation of 60 degrees.      Radiographs obtained demonstrate continued remodeling of the proximal humeral bone graft in the presence of a carbon fiber plate with no evidence of cartilaginous tumor regrowth.         Assessment and Plan:   Assessment:  42-year-old female is approximately 12 months status post curettage and allograft packing of right proximal humerus bone lesion with internal fixation of right proximal humerus with carbon fiber plate.  Recovering appropriately. No evidence of recurrence on imaging.      Plan:  Continue activity as tolerated - no restrictions    Follow-up in 1 year with repeat imaging of the right  shoulder. Patient desires in-person appointment.    MD Chevy Velez Family Professor  Oncology and Adult Reconstructive Surgery  Dept Orthopaedic Surgery, [facility]  [phone] office, [phone] pager  [email or web address]    Total combined visit time and work time before and after clinic visit on encounter date = 20 min

## 2024-02-19 NOTE — PROGRESS NOTES
[facility], Orthopaedic Oncology Surgery Consultation  by Iglesia Vann M.D.    Josie Gamez MRN# [id number]    YOB: 1980     Requesting physician: No ref. 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 Vann, [facility]      Elisa is seen for recheck 1 year after her biopsy and curettage and allograft packing procedure for the right proximal humerus low-grade cartilaginous tumor.    She reports no symptoms of discomfort other than occasional sensation when she abducts her arm.  Otherwise she is able perform all regular activities.  She has been healthy otherwise.    Examination demonstrates forward flexion 180 degrees, abduction to 180 but some discomfort with 90 degrees of abduction.  Internal rotation to L5 and external rotation of 60 degrees.      Radiographs obtained demonstrate continued remodeling of the proximal humeral bone graft in the presence of a carbon fiber plate with no evidence of cartilaginous tumor regrowth.         Assessment and Plan:   Assessment:  42-year-old female is approximately 12 months status post curettage and allograft packing of right proximal humerus bone lesion with internal fixation of right proximal humerus with carbon fiber plate.  Recovering appropriately. No evidence of recurrence on imaging.      Plan:  Continue activity as tolerated - no restrictions    Follow-up in 1 year with repeat imaging of the right shoulder. Patient desires in-person appointment.    MD Chevy Velez Family Professor  Oncology and Adult Reconstructive Surgery  Dept Orthopaedic Surgery, [facility]  [phone] office, [phone] pager  [email or web address]    Total combined visit  time and work time before and after clinic visit on encounter date = 20 min

## 2024-11-08 DIAGNOSIS — Z98.890 S/P ORIF (OPEN REDUCTION INTERNAL FIXATION) FRACTURE: Primary | ICD-10-CM

## 2024-11-08 DIAGNOSIS — Z87.81 S/P ORIF (OPEN REDUCTION INTERNAL FIXATION) FRACTURE: Primary | ICD-10-CM

## 2024-11-08 NOTE — PROGRESS NOTES
[facility], Orthopaedic Oncology Surgery Consultation  by Iglesia Vann M.D.    Josie Gamez MRN# [id number]    YOB: 1980     Requesting physician: No ref. 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 Vann, [facility]        I met with Elisa today as she has had a 2-month history of pain discomfort involving her right shoulder when she lifts her arm overhead to write on the chalk board.  She was not previously having symptoms.  She is right-hand dominant.      Examination demonstrates positive impingement sign with pain on forced forward flexion and abduction.  No pain with external rotation.  Full range of motion passively.    Radiographs demonstrate no evidence of regrowth of a cartilaginous neoplasm involving the right proximal humerus and a bone graft is incorporated nicely.  Internal fixation is satisfactorily positioned.    Impression:  No evidence of relapse of her low-grade cartilage tumor or of complication related to her hardware.  Suspect symptoms are more likely related to rotator cuff pathology and impingement.    Recommendations:  If symptoms persist or progress, she will contact us and we will arrange for MRI examination of her right shoulder and proximal humerus to evaluate rotator cuff as well as the proximal humeral area.  Follow-up afterwards in person for possible diagnostic lidocaine injection into the subacromial bursa.    MD Chevy Velez Family Professor  Oncology and Adult Reconstructive Surgery  Dept Orthopaedic Surgery, [facility]  [phone] office, [phone] pager  [email or web address]    Total combined visit time and work time before and after clinic visit,  independent of trainee, on encounter date = 20 min

## 2024-11-14 ENCOUNTER — OFFICE VISIT (OUTPATIENT)
Dept: ORTHOPEDICS | Facility: CLINIC | Age: 44
End: 2024-11-14
Payer: COMMERCIAL

## 2024-11-14 ENCOUNTER — ANCILLARY PROCEDURE (OUTPATIENT)
Dept: GENERAL RADIOLOGY | Facility: CLINIC | Age: 44
End: 2024-11-14
Attending: ORTHOPAEDIC SURGERY
Payer: COMMERCIAL

## 2024-11-14 DIAGNOSIS — Z98.890 S/P ORIF (OPEN REDUCTION INTERNAL FIXATION) FRACTURE: ICD-10-CM

## 2024-11-14 DIAGNOSIS — Z87.81 S/P ORIF (OPEN REDUCTION INTERNAL FIXATION) FRACTURE: ICD-10-CM

## 2024-11-14 DIAGNOSIS — Z98.890 S/P ORIF (OPEN REDUCTION INTERNAL FIXATION) FRACTURE: Primary | ICD-10-CM

## 2024-11-14 DIAGNOSIS — Z87.81 S/P ORIF (OPEN REDUCTION INTERNAL FIXATION) FRACTURE: Primary | ICD-10-CM

## 2024-11-14 PROCEDURE — 73030 X-RAY EXAM OF SHOULDER: CPT | Mod: RT | Performed by: RADIOLOGY

## 2024-11-14 RX ORDER — DEXTROAMPHETAMINE SACCHARATE, AMPHETAMINE ASPARTATE MONOHYDRATE, DEXTROAMPHETAMINE SULFATE AND AMPHETAMINE SULFATE 3.75; 3.75; 3.75; 3.75 MG/1; MG/1; MG/1; MG/1
1 CAPSULE, EXTENDED RELEASE ORAL DAILY
COMMUNITY
Start: 2024-11-13 | End: 2024-12-13

## 2024-11-14 RX ORDER — LACTOBACILLUS RHAMNOSUS GG 10B CELL
CAPSULE ORAL
COMMUNITY
Start: 2024-09-09

## 2024-11-14 RX ORDER — DOXEPIN 3 MG/1
1 TABLET, FILM COATED ORAL DAILY
COMMUNITY
Start: 2024-10-17

## 2024-11-14 RX ORDER — MULTIVITAMIN WITH IRON
TABLET ORAL
COMMUNITY
Start: 2024-09-09

## 2024-11-14 RX ORDER — ZOLMITRIPTAN 2.5 MG/1
2.5 TABLET, FILM COATED ORAL
COMMUNITY
Start: 2023-09-11

## 2024-11-14 NOTE — LETTER
11/14/2024      Josie Gamez  [address]  [place] [address]      Dear Colleague,    Thank you for referring your patient, Joise Gamez, to the [facility] [place]. Please see a copy of my visit note below.        [facility], Orthopaedic Oncology Surgery Consultation  by Iglesia Vann M.D.    Josie Gamez MRN# [id number]    YOB: 1980     Requesting physician: No ref. 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 Vann, [facility]        I met with Elisa today as she has had a 2-month history of pain discomfort involving her right shoulder when she lifts her arm overhead to write on the chalk board.  She was not previously having symptoms.  She is right-hand dominant.      Examination demonstrates positive impingement sign with pain on forced forward flexion and abduction.  No pain with external rotation.  Full range of motion passively.    Radiographs demonstrate no evidence of regrowth of a cartilaginous neoplasm involving the right proximal humerus and a bone graft is incorporated nicely.  Internal fixation is satisfactorily positioned.    Impression:  No evidence of relapse of her low-grade cartilage tumor or of complication related to her hardware.  Suspect symptoms are more likely related to rotator cuff pathology and impingement.    Recommendations:  If symptoms persist or progress, she will contact us and we will arrange for MRI examination of her right shoulder and proximal humerus to evaluate rotator cuff as well as the proximal humeral area.  Follow-up afterwards in person for possible diagnostic lidocaine injection into the subacromial bursa.    Iglesia RAMOS  MD Chevy Vann Family Professor  Oncology and Adult Reconstructive Surgery  Dept Orthopaedic Surgery, [facility]  [phone] office, [phone] pager  [email or web address]    Total combined visit time and work time before and after clinic visit, independent of trainee, on encounter date = 20 min      Again, thank you for allowing me to participate in the care of your patient.        Sincerely,        Iglesia Vann MD

## 2025-04-05 ENCOUNTER — HEALTH MAINTENANCE LETTER (OUTPATIENT)
Age: 45
End: 2025-04-05

## (undated) DEVICE — SU PDS II 3-0 PS-1 18" Z683G

## (undated) DEVICE — Device

## (undated) DEVICE — PREP DURAPREP 26ML APL 8630

## (undated) DEVICE — DRSG TELFA 3X8" 1238

## (undated) DEVICE — SU VICRYL 0 CT-1 27" J340H

## (undated) DEVICE — SOL NACL 0.9% IRRIG 1000ML BOTTLE 2F7124

## (undated) DEVICE — LINEN GOWN OVERSIZE 5408

## (undated) DEVICE — SU ETHIBOND 1 CT-1 30" X425H

## (undated) DEVICE — GLOVE BIOGEL PI ULTRATOUCH SZ 7.0 41170

## (undated) DEVICE — GOWN IMPERVIOUS SPECIALTY XLG/XLONG 32474

## (undated) DEVICE — TOOL DISSECT MIDAS MR8 9CM OVAL 5.5MM DIA MR8-9OV55

## (undated) DEVICE — GLOVE BIOGEL PI MICRO INDICATOR UNDERGLOVE SZ 7.5 48975

## (undated) DEVICE — SPONGE LAP 18X18" X8435

## (undated) DEVICE — LINEN TOWEL PACK X5 5464

## (undated) DEVICE — NDL 22GA 1.5"

## (undated) DEVICE — DRSG TEGADERM 4X10" 1627

## (undated) DEVICE — PREP SKIN SCRUB TRAY 4461A

## (undated) DEVICE — SPECIMEN CONTAINER 5OZ STERILE 2600SA

## (undated) DEVICE — PREP POVIDONE-IODINE 7.5% SCRUB 4OZ BOTTLE MDS093945

## (undated) DEVICE — DECANTER TRANSFER DEVICE 2008S

## (undated) DEVICE — LINEN ORTHO PACK 5446

## (undated) DEVICE — STRAP KNEE/BODY 31143004

## (undated) DEVICE — CATH TRAY FOLEY SURESTEP 16FR WDRAIN BAG STLK LATEX A300316A

## (undated) DEVICE — ESU PENCIL W/HOLSTER E2350H

## (undated) DEVICE — SUCTION MANIFOLD NEPTUNE 2 SYS 4 PORT 0702-020-000

## (undated) DEVICE — DRAPE C-ARM W/STRAPS 42X72" 07-CA104

## (undated) DEVICE — SU VICRYL 2-0 CT 36" J957H

## (undated) DEVICE — SYR BULB IRRIG DOVER 60 ML LATEX FREE 67000

## (undated) DEVICE — ESU GROUND PAD UNIVERSAL W/O CORD

## (undated) DEVICE — PREP DURAPREP REMOVER 4OZ 8611

## (undated) DEVICE — DRSG TEGADERM ALGINATE AG 4X5" 90303

## (undated) DEVICE — DRAPE STOCKINETTE 4" 8544

## (undated) DEVICE — SUCTION TIP YANKAUER STR K87

## (undated) DEVICE — SOL WATER IRRIG 1000ML BOTTLE 2F7114

## (undated) DEVICE — RESTRAINT LIMB HOLDER ANKLE/WRIST FOAM W/QUICK RELEASE 2533

## (undated) RX ORDER — FENTANYL CITRATE-0.9 % NACL/PF 10 MCG/ML
PLASTIC BAG, INJECTION (ML) INTRAVENOUS
Status: DISPENSED
Start: 2023-01-24

## (undated) RX ORDER — PROPOFOL 10 MG/ML
INJECTION, EMULSION INTRAVENOUS
Status: DISPENSED
Start: 2023-01-24

## (undated) RX ORDER — ONDANSETRON 2 MG/ML
INJECTION INTRAMUSCULAR; INTRAVENOUS
Status: DISPENSED
Start: 2023-01-24

## (undated) RX ORDER — FENTANYL CITRATE 50 UG/ML
INJECTION, SOLUTION INTRAMUSCULAR; INTRAVENOUS
Status: DISPENSED
Start: 2023-01-24

## (undated) RX ORDER — HYDROMORPHONE HYDROCHLORIDE 1 MG/ML
INJECTION, SOLUTION INTRAMUSCULAR; INTRAVENOUS; SUBCUTANEOUS
Status: DISPENSED
Start: 2023-01-24

## (undated) RX ORDER — OXYCODONE HCL 5 MG/5 ML
SOLUTION, ORAL ORAL
Status: DISPENSED
Start: 2023-01-24

## (undated) RX ORDER — BUPIVACAINE HYDROCHLORIDE AND EPINEPHRINE 2.5; 5 MG/ML; UG/ML
INJECTION, SOLUTION EPIDURAL; INFILTRATION; INTRACAUDAL; PERINEURAL
Status: DISPENSED
Start: 2023-01-24

## (undated) RX ORDER — EPHEDRINE SULFATE 50 MG/ML
INJECTION, SOLUTION INTRAMUSCULAR; INTRAVENOUS; SUBCUTANEOUS
Status: DISPENSED
Start: 2023-01-24

## (undated) RX ORDER — ACETAMINOPHEN 325 MG/1
TABLET ORAL
Status: DISPENSED
Start: 2023-01-24